# Patient Record
Sex: FEMALE | Race: WHITE | Employment: FULL TIME | URBAN - METROPOLITAN AREA
[De-identification: names, ages, dates, MRNs, and addresses within clinical notes are randomized per-mention and may not be internally consistent; named-entity substitution may affect disease eponyms.]

---

## 2017-09-12 PROBLEM — M65.342 TRIGGER RING FINGER OF LEFT HAND: Status: ACTIVE | Noted: 2017-09-12

## 2017-10-10 ENCOUNTER — TELEPHONE (OUTPATIENT)
Dept: INTERNAL MEDICINE CLINIC | Facility: CLINIC | Age: 67
End: 2017-10-10

## 2017-10-10 DIAGNOSIS — Z13.220 SCREENING, LIPID: ICD-10-CM

## 2017-10-10 DIAGNOSIS — E03.9 HYPOTHYROIDISM, UNSPECIFIED TYPE: Primary | ICD-10-CM

## 2017-10-10 DIAGNOSIS — Z13.0 SCREENING FOR DEFICIENCY ANEMIA: ICD-10-CM

## 2017-10-10 DIAGNOSIS — Z13.1 SCREENING FOR DIABETES MELLITUS: ICD-10-CM

## 2017-10-16 ENCOUNTER — OFFICE VISIT (OUTPATIENT)
Dept: INTERNAL MEDICINE CLINIC | Facility: CLINIC | Age: 67
End: 2017-10-16

## 2017-10-16 ENCOUNTER — PATIENT MESSAGE (OUTPATIENT)
Dept: INTERNAL MEDICINE CLINIC | Facility: CLINIC | Age: 67
End: 2017-10-16

## 2017-10-16 VITALS
RESPIRATION RATE: 16 BRPM | HEART RATE: 68 BPM | TEMPERATURE: 98 F | SYSTOLIC BLOOD PRESSURE: 112 MMHG | HEIGHT: 61.5 IN | BODY MASS INDEX: 24.23 KG/M2 | DIASTOLIC BLOOD PRESSURE: 64 MMHG | WEIGHT: 130 LBS

## 2017-10-16 DIAGNOSIS — Z13.1 SCREENING FOR DIABETES MELLITUS: ICD-10-CM

## 2017-10-16 DIAGNOSIS — Z13.220 SCREENING, LIPID: ICD-10-CM

## 2017-10-16 DIAGNOSIS — E03.9 HYPOTHYROIDISM, UNSPECIFIED TYPE: ICD-10-CM

## 2017-10-16 DIAGNOSIS — Z00.00 ENCOUNTER FOR ANNUAL HEALTH EXAMINATION: Primary | ICD-10-CM

## 2017-10-16 PROBLEM — M65.342 TRIGGER RING FINGER OF LEFT HAND: Status: RESOLVED | Noted: 2017-09-12 | Resolved: 2017-10-16

## 2017-10-16 PROCEDURE — G0402 INITIAL PREVENTIVE EXAM: HCPCS | Performed by: FAMILY MEDICINE

## 2017-10-16 PROCEDURE — G0008 ADMIN INFLUENZA VIRUS VAC: HCPCS | Performed by: FAMILY MEDICINE

## 2017-10-16 PROCEDURE — 90653 IIV ADJUVANT VACCINE IM: CPT | Performed by: FAMILY MEDICINE

## 2017-10-16 NOTE — PROGRESS NOTES
HPI:   Verna Sousa is a 74year old female who presents for a Medicare Initial Preventative Physical Exam (Welcome to Medicare- < 12 months on Medicare)      Here for annual check-up. Patient has no complaints.  Sees Endocrinology for hypothyroid by Does not apply route. aspirin 81 MG Oral Tab Take 81 mg by mouth daily. Glucosamine-Chondroit-Vit C-Mn (GLUCOSAMINE 1500 COMPLEX OR) Take  by mouth. Cholecalciferol (VITAMIN D-3 OR) Take  by mouth. Ascorbic Acid (VITAMIN C OR) Take  by mouth. (Left, 1/2004); other surgical history (Bilateral, 3/1999); foot/toes surgery proc unlisted (Left, 4/2012); electrocardiogram, complete (6/5/2013); other surgical history (4/13/2011, 6/21/2006); upper gi endoscopy,exam (6/12/2006); dental surgery procedure 112/64 (BP Location: Right arm, Patient Position: Sitting, Cuff Size: adult)   Pulse 68   Temp 98.4 °F (36.9 °C) (Oral)   Resp 16   Ht 61.5\"   Wt 130 lb   BMI 24.17 kg/m²  Estimated body mass index is 24.17 kg/m² as calculated from the following:    Marissa 03/17/2015, 09/15/2015, 09/12/2017   • IPV 09/26/2012   • Influenza 10/24/2011   • Influenza Vaccine, No Preserv, 3YR + 10/05/2015, 10/07/2016   • Pneumococcal (Prevnar 13) 10/05/2015   • Pneumovax 23 10/07/2016   • TDAP 12/21/2012   • Tb Intradermal Test Exercise;Stretching    How would you describe your daily physical activity?: Moderate    How would you describe your current health state?: Good    How do you maintain positive mental well-being?: Social Interaction;Games; Visiting Friends; Visiting Family and document. Then, refresh your progress note to see your input here.   Cognitive Assessment     What day of the week is this?: Correct    What month is it?: Correct    What year is it?: Correct    Recall \"Ball\": Correct    Recall \"Flag\": Correct Annually to 76, then as discussed Keke Kirkpatrick due on 04/29/1992 Update Health Maintenance if applicable     Immunizations (Update Immunization Activity if applicable)     Influenza  Covered Annually 10/7/2016 Please get every year    Pneumococcal 13 (Pr data found. Creat/alb ratio  Annually      LDL  Annually LDL-CHOLESTEROL (mg/dL (calc))   Date Value   09/30/2015 128     LDL Cholesterol (mg/dL)   Date Value   10/05/2016 110    No flowsheet data found.      Dilated Eye exam  Annually No flowsheet data

## 2017-10-16 NOTE — PATIENT INSTRUCTIONS
Roscoe Gonsalez's SCREENING SCHEDULE   Tests on this list are recommended by your physician but may not be covered, or covered at this frequency, by your insurer. Please check with your insurance carrier before scheduling to verify coverage.    PREVEN of the following criteria:   • Men who are 73-68 years old and have smoked more than 100 cigarettes in their lifetime   • Anyone with a family history    Colorectal Cancer Screening  Covered up to Age 76     Colonoscopy Screen   Covered every 10 years- mor performed in visit on 10/07/16  -FLU VAC NO PRSV 4 LEANNA 3 YRS+   Orders placed or performed in visit on 10/05/15  -FLU VAC NO PRSV 4 LEANNA 3 YRS+    Please get every year    Pneumococcal 13 (Prevnar)  Covered Once after 65   Orders placed or performed in visi [facility] regarding Advance Directives.

## 2017-10-17 NOTE — TELEPHONE ENCOUNTER
From: Denver Springer  To: Russell Andino DO  Sent: 10/16/2017 4:29 PM CDT  Subject: Other    Dr. Yael Dominguez,    These are the vaccinations that I have received over the last 5 years:  9/25/12 Hepatitis A 1 ml.  9/25/12 Typhoid Vi . 5 ml  9/26/12 IPV .

## 2017-11-15 ENCOUNTER — PATIENT MESSAGE (OUTPATIENT)
Dept: INTERNAL MEDICINE CLINIC | Facility: CLINIC | Age: 67
End: 2017-11-15

## 2018-01-03 ENCOUNTER — HOSPITAL (OUTPATIENT)
Dept: OTHER | Age: 68
End: 2018-01-03
Attending: OBSTETRICS & GYNECOLOGY

## 2018-01-11 NOTE — TELEPHONE ENCOUNTER
1/11/2018  SUBJECTIVE:  11 month old male with the following problems listed below presents with  hospital follow up for prolonged seizure that lasted longer than 40 minutes and was associated with respiratory depression and inability to maintain airway. Associated with fever. Viral panel showed coronavirus.  He was intubated overnight. Extubated without problem following morning. Diet was advanced without incident. Yesterday morning a pancakes for breakfast, only formula for lunch. Upon returning home he vomited. He then fell asleep and did not eat supper. He woke once in the night with a wet diaper and had a bottle of formula. He slept well through the night. This morning he had a diarrheal stool and wet diaper upon waking. Drank formula without further emesis, but not interested in solids. Otherwise behavior seems back to baseline.  They were discharged with instructions on controlling fever with Tylenol. A prescription of Tylenol was given to provide at . They have Diastat to use if a seizure lasts longer than 5 minutes and instructed to call 911  Both parents are very afraid because of what happened.    Current Outpatient Prescriptions   Medication Sig   • acetaminophen (TYLENOL) 160 MG/5ML solution Take 15 mg/kg by mouth every 4 hours as needed for Fever.   • DIAZepam (DIASTAT ACUDIAL) 10 MG rectal gel      No current facility-administered medications for this visit.        Patient Active Problem List    Diagnosis Date Noted   • BMI (body mass index), pediatric, 5% to less than 85% for age 2017     Priority: Medium   • Febrile seizure with status epilepticus (CMS/[facility]) 01/08/2018     Priority: Low     1/8/18 40 minutes. Intubated  1/18: Neuro: Marisela Schwarz: in hosp. Head CT neg. LTM EEG neg.          ALLERGIES: no known allergies.    Social History   Substance Use Topics   • Smoking status: Passive Smoke Exposure - Never Smoker   • Smokeless tobacco: Never Used      Comment: mom smokes in  Sanya messaged patient regarding last mammogram report. To call us to let us know where she had it completed so I can obtain the report. house   • Alcohol use No     Review of patient's social economics indicates:  No social economics status on file      ROS:  No additional fever since discharge home    OBJECTIVE: Pulse 156, temperature 97.6 °F (36.4 °C), temperature source Temporal Artery, resp. rate 24, weight 10.5 kg.  General: no apparent distress  Ears nose normal  Mouth mucous membranes are moist. No oral lesions  Neck-no cervical adenopathy  Lungs-clear to auscultation with quiet unlabored respirations  Heart-regular rhythm. No murmur  Abd: distended . Tympanic. Soft. Nontender. Normoactive bowel sounds  Extremities: Capillary refill time 2 seconds  Neuro: Moves all 4 extremities equally. Has equal strength in both upper extremities. Cranial nerves are grossly intact with good eye contact and facial muscle movements. Stands only with support    ASSESSMENT:     1. Febrile seizure with status epilepticus (CMS/HCC)    Supportive care discussed.  management discussed. Recommend if he has any seizure that they contact one of the parents. The parent would then determine whether to call 911 or use Diastat or just observe.    They have follow-up with neurology. Strongly recommend an MRI be performed due to the abnormal nature of the seizure. Discussion about antiepileptics should ensue at that visit due to level of parent concern    25 minutes was spent in direct face to face contact for discussion and exam of the above issues.

## 2018-04-06 ENCOUNTER — TELEPHONE (OUTPATIENT)
Dept: INTERNAL MEDICINE CLINIC | Facility: CLINIC | Age: 68
End: 2018-04-06

## 2018-04-06 NOTE — TELEPHONE ENCOUNTER
Future Appointments  Date Time Provider Rakel Grace   4/11/2018 2:45 PM [facility] N[facility]SE [facility] IM   4/12/2018 7:30 AM Shaka Hinds MD [facility]      Please put orders in for shingrix inj-pt has appt 4/11

## 2018-04-06 NOTE — TELEPHONE ENCOUNTER
Pt. Scheduled for [employer] on 4/11/18. Order pended for your approval if ok. Please advise.

## 2018-08-15 ENCOUNTER — TELEPHONE (OUTPATIENT)
Dept: INTERNAL MEDICINE CLINIC | Facility: CLINIC | Age: 68
End: 2018-08-15

## 2018-09-14 ENCOUNTER — TELEPHONE (OUTPATIENT)
Dept: INTERNAL MEDICINE CLINIC | Facility: CLINIC | Age: 68
End: 2018-09-14

## 2018-09-14 DIAGNOSIS — Z00.00 ROUTINE GENERAL MEDICAL EXAMINATION AT A HEALTH CARE FACILITY: Primary | ICD-10-CM

## 2018-09-14 NOTE — TELEPHONE ENCOUNTER
Future Appointments   Date Time Provider Rakel Lesly   10/18/2018 10:00 AM Susana Bishop DO [facility] IM   4/17/2019  7:30 AM Kellie Rodriguez MD [facility]      [facility]ders to [facility]- Pt aware to fast-no call back required

## 2018-10-18 ENCOUNTER — APPOINTMENT (OUTPATIENT)
Dept: LAB | Age: 68
End: 2018-10-18
Attending: FAMILY MEDICINE
Payer: MEDICARE

## 2018-10-18 ENCOUNTER — OFFICE VISIT (OUTPATIENT)
Dept: INTERNAL MEDICINE CLINIC | Facility: CLINIC | Age: 68
End: 2018-10-18
Payer: MEDICARE

## 2018-10-18 VITALS
DIASTOLIC BLOOD PRESSURE: 72 MMHG | HEART RATE: 70 BPM | BODY MASS INDEX: 24.04 KG/M2 | SYSTOLIC BLOOD PRESSURE: 112 MMHG | RESPIRATION RATE: 16 BRPM | TEMPERATURE: 98 F | HEIGHT: 61.5 IN | WEIGHT: 129 LBS

## 2018-10-18 DIAGNOSIS — M85.80 OSTEOPENIA, UNSPECIFIED LOCATION: ICD-10-CM

## 2018-10-18 DIAGNOSIS — E03.9 HYPOTHYROIDISM, UNSPECIFIED TYPE: ICD-10-CM

## 2018-10-18 DIAGNOSIS — J44.9 CHRONIC OBSTRUCTIVE PULMONARY DISEASE, UNSPECIFIED COPD TYPE (HCC): ICD-10-CM

## 2018-10-18 DIAGNOSIS — Z11.59 NEED FOR HEPATITIS C SCREENING TEST: ICD-10-CM

## 2018-10-18 DIAGNOSIS — Z00.00 ENCOUNTER FOR ANNUAL HEALTH EXAMINATION: Primary | ICD-10-CM

## 2018-10-18 PROCEDURE — G0439 PPPS, SUBSEQ VISIT: HCPCS | Performed by: FAMILY MEDICINE

## 2018-10-18 PROCEDURE — 90653 IIV ADJUVANT VACCINE IM: CPT | Performed by: FAMILY MEDICINE

## 2018-10-18 PROCEDURE — G0008 ADMIN INFLUENZA VIRUS VAC: HCPCS | Performed by: FAMILY MEDICINE

## 2018-10-18 PROCEDURE — 86803 HEPATITIS C AB TEST: CPT

## 2018-10-18 RX ORDER — LEVOTHYROXINE SODIUM 0.12 MG/1
125 TABLET ORAL
COMMUNITY
Start: 2018-05-23 | End: 2018-10-23

## 2018-10-18 NOTE — PROGRESS NOTES
HPI:   Nesiha Larios is a 79year old female who presents for a Medicare Subsequent Annual Wellness visit (Pt already had Initial Annual Wellness). Here for annual check-up. Patient has no complaints.        Hypothyroidism, unspecified type- taki 1982        Years since quittin.8      Smokeless tobacco: Never Used         CAGE Alcohol screening   Flex Carranza was screened for Alcohol abuse and had a score of 0 so is at low risk.     Patient Care Team: Patient Care Team:  Matthew Chaney Acid (VITAMIN C OR) Take  by mouth.       MEDICAL INFORMATION:   She  has a past medical history of [id number], & 36278/SX GLOABL RLW/RT ARM/ EXP 05/28/2014 (1/7/2014), AC (acromioclavicular) arthritis (6/3/2013), Back problem, Bicipital tenosynovitis (6/3 endoscopy,exam (6/12/2006); dental surgery procedure; Breast biopsy; carpal tunnel release (Right, 2/27/14); shoulder arthroscopy (10/2013, 7/2/2013); correct bunion,othr methods; hand/finger surgery unlisted (Left, 2/26/18--Dr. Billie Odonnell); TRIGGER FINGER RELE Patient Position: Sitting, Cuff Size: adult)   Pulse 70   Temp 98.1 °F (36.7 °C) (Oral)   Resp 16   Ht 61.5\"   Wt 129 lb   BMI 23.98 kg/m²  Estimated body mass index is 23.98 kg/m² as calculated from the following:    Height as of this encounter: 61.5\". 09/28/2017   • IPV 09/26/2012   • Influenza 10/24/2011   • Pneumococcal (Prevnar 13) 10/05/2015   • Pneumovax 23 10/07/2016   • TDAP 12/21/2012   • Tb Intradermal Test 09/02/2008   • Typhoid,VI Polysacharide IM 09/25/2012, 08/28/2017   • Zoster Vaccine Ondina 10/10/2018 118 (H)        EKG - w/ Initial Preventative Physical Exam only, or if medically necessary Electrocardiogram date10/05/2016       Colorectal Cancer Screening      Colonoscopy Screen every 10 years Colonoscopy due on 11/17/2018 Update Health Ma history found This may be covered with your prescription benefits, but Medicare does not cover unless Medically needed    Zoster  Not covered by Medicare Part B No vaccine history found This may be covered with your pharmacy  prescription benefits      SPE

## 2018-10-18 NOTE — PATIENT INSTRUCTIONS
Roscoe Gonsalez's SCREENING SCHEDULE   Tests on this list are recommended by your physician but may not be covered, or covered at this frequency, by your insurer. Please check with your insurance carrier before scheduling to verify coverage.    PREVEN 10 years- more often if abnormal Colonoscopy due on 11/17/2018 Update Health Maintenance if applicable    Flex Sigmoidoscopy Screen  Covered every 5 years No results found for this or any previous visit. No flowsheet data found.      Fecal Occult Blood   Co 10/05/15   • PNEUMOCOCCAL VACC, 13 LEANNA IM    Please get once after your 65th birthday    Pneumococcal 23 (Pneumovax)  Covered Once after 65 Orders placed or performed in visit on 10/07/16   • PNEUMOCOCCAL IMM (PNEUMOVAX)    Please get once after your 65th carrier before scheduling to verify coverage.    PREVENTATIVE SERVICES  INDICATIONS AND SCHEDULE Internal Lab or Procedure External Lab or Procedure   Diabetes Screening      HbgA1C    At Least  Annually for Diabetics No results found for: A1C No flowsheet No flowsheet data found. Fecal Occult Blood   Covered Annually No results found for: FOB, OCCULTSTOOL No flowsheet data found.      Barium Enema-   uncomfortable but covered  Covered but uncomfortable   Glaucoma Screening      Ophthalmology Visit   [facility] IMM (PNEUMOVAX)    Please get once after your 65th birthday    Hepatitis B for Moderate/High Risk       No orders found for this or any previous visit.  Medium/high risk factors:   End-stage renal disease   Hemophiliacs who received Factor VIII or IX concen

## 2018-10-21 PROBLEM — M65.342 TRIGGER RING FINGER OF LEFT HAND: Status: RESOLVED | Noted: 2017-09-12 | Resolved: 2018-10-21

## 2018-12-07 LAB — COLONOSCOPY STUDY: NORMAL

## 2019-01-04 ENCOUNTER — HOSPITAL (OUTPATIENT)
Dept: OTHER | Age: 69
End: 2019-01-04
Attending: OBSTETRICS & GYNECOLOGY

## 2019-08-15 ENCOUNTER — TELEPHONE (OUTPATIENT)
Dept: INTERNAL MEDICINE CLINIC | Facility: CLINIC | Age: 69
End: 2019-08-15

## 2019-09-10 ENCOUNTER — TELEPHONE (OUTPATIENT)
Dept: INTERNAL MEDICINE CLINIC | Facility: CLINIC | Age: 69
End: 2019-09-10

## 2019-09-10 DIAGNOSIS — E78.5 HYPERLIPIDEMIA, UNSPECIFIED HYPERLIPIDEMIA TYPE: ICD-10-CM

## 2019-09-10 DIAGNOSIS — J44.9 CHRONIC OBSTRUCTIVE PULMONARY DISEASE, UNSPECIFIED COPD TYPE (HCC): ICD-10-CM

## 2019-09-10 DIAGNOSIS — E03.9 HYPOTHYROIDISM, UNSPECIFIED TYPE: Primary | ICD-10-CM

## 2019-09-10 NOTE — TELEPHONE ENCOUNTER
Patient is scheduled for AWV. Please place lab orders to [facility] Pelt make sure labs are ordered with DX codes and not screening. Patient had issues in the past as coded wrong.      Future Appointments   Date Time Provider Rakel Grace   10/22/2019  9:

## 2019-09-10 NOTE — TELEPHONE ENCOUNTER
Called pt to confirm as preferred lab is listed as [facility]. Pt stated to change to [facility] as preferred. Done. AWV orders pended for your approval.  Please advise, thanks.

## 2019-10-22 ENCOUNTER — OFFICE VISIT (OUTPATIENT)
Dept: INTERNAL MEDICINE CLINIC | Facility: CLINIC | Age: 69
End: 2019-10-22
Payer: MEDICARE

## 2019-10-22 VITALS
BODY MASS INDEX: 24.32 KG/M2 | HEART RATE: 68 BPM | HEIGHT: 61.5 IN | RESPIRATION RATE: 16 BRPM | WEIGHT: 130.5 LBS | DIASTOLIC BLOOD PRESSURE: 72 MMHG | SYSTOLIC BLOOD PRESSURE: 114 MMHG | TEMPERATURE: 98 F

## 2019-10-22 DIAGNOSIS — Z00.00 ENCOUNTER FOR ANNUAL HEALTH EXAMINATION: Primary | ICD-10-CM

## 2019-10-22 DIAGNOSIS — E03.9 HYPOTHYROIDISM, UNSPECIFIED TYPE: ICD-10-CM

## 2019-10-22 DIAGNOSIS — E78.5 HYPERLIPIDEMIA, UNSPECIFIED HYPERLIPIDEMIA TYPE: ICD-10-CM

## 2019-10-22 DIAGNOSIS — M85.80 OSTEOPENIA, UNSPECIFIED LOCATION: ICD-10-CM

## 2019-10-22 PROCEDURE — G0439 PPPS, SUBSEQ VISIT: HCPCS | Performed by: FAMILY MEDICINE

## 2019-10-22 PROCEDURE — 90662 IIV NO PRSV INCREASED AG IM: CPT | Performed by: FAMILY MEDICINE

## 2019-10-22 PROCEDURE — G0008 ADMIN INFLUENZA VIRUS VAC: HCPCS | Performed by: FAMILY MEDICINE

## 2019-10-22 RX ORDER — OMEPRAZOLE 20 MG/1
20 CAPSULE, DELAYED RELEASE ORAL
Qty: 90 CAPSULE | Refills: 3 | Status: SHIPPED | OUTPATIENT
Start: 2019-10-22 | End: 2020-10-05

## 2019-10-22 NOTE — PROGRESS NOTES
2019 January  MAMMO ADVOCATE PROCEDURE1/4/2019  [facility]  Result Impression   BENIGN    There is no mammographic evidence of malignancy. A 1 year screening mammogram is recommended.     MAMMOGRAPHY BI-RADS: Jg kwon/hossein There are scattered fibroglandular elements in both breasts. No significant masses, calcifications, or other findings are seen in either breast.  Current study was also evaluated with a Computer Aided Detection (CAD) system.  Digital Breast   [employer] PHQ-2 SCORE: 0     Advanced Directive:   She does have a Living Will but we do NOT have it on file in [facility] [address]    Advance care planning including the explanation and discussion of advance directives standard forms performed Face to Face with patient and Family ALLERGIES:   She is allergic to dust mites; geocillin [carbenicillin]; mold spores; and penicillins. CURRENT MEDICATIONS:   OIL OF OREGANO OR, Take by mouth.   omeprazole 20 MG Oral Capsule Delayed Release, Take 1 capsule (20 mg total) by mouth every mor She  has a past medical history of [id number],50138, & 38868/SX GLOABL RLW/RT ARM/ EXP 05/28/2014 (1/7/2014), AC (acromioclavicular) arthritis (6/3/2013), Back problem, Bicipital tenosynovitis (6/3/2013), Biliary dyskinesia (10/2006), Body mass index between 19 She  has a past surgical history that includes cholecystectomy (10/25/2006); cataract (Bilateral, 2010); hysterectomy (3/1992);  (1989); hand/finger surgery unlisted (2014, 2012, 2005); other surgical history; foot/toes surgery proc unli SKIN: denies any unusual skin lesions  EYES: denies blurred vision or double vision  HEENT: denies nasal congestion, sinus pain or ST  LUNGS: denies shortness of breath with exertion  CARDIOVASCULAR: denies chest pain on exertion  GI: denies abdominal pain Pelvic: Deferred   Extremities: Extremities normal, atraumatic, no cyanosis or edema   Pulses: 2+ and symmetric   Skin: Skin color, texture, turgor normal, no rashes or lesions   Lymph nodes: Cervical, supraclavicular, and axillary nodes normal   Neurologi In the past six months, have you lost more than 10 pounds without trying?: (P) 2 - No  Has your appetite been poor?: (P) No  How does the patient maintain a good energy level?: Appropriate Exercise;Daily Walks;Stretching  How would you describe your daily Screening Mammogram      Mammogram Annually to 78, then as discussed Mammogram due on 01/04/2020 Update Health Maintenance if applicable     Immunizations (Update Immunization Activity if applicable)     Influenza  Covered Annually 10/18/2018 Please get ev

## 2019-10-22 NOTE — PATIENT INSTRUCTIONS
Terrence Gonsalez's SCREENING SCHEDULE   Tests on this list are recommended by your physician but may not be covered, or covered at this frequency, by your insurer. Please check with your insurance carrier before scheduling to verify coverage.    PREVEN more often if abnormal Colonoscopy due on 12/07/2023 Update Health Maintenance if applicable    Flex Sigmoidoscopy Screen  Covered every 5 years No results found for this or any previous visit. No flowsheet data found.      Fecal Occult Blood   Covered Maryann (Prevnar)  Covered Once after 65 Orders placed or performed in visit on 10/05/15   • PNEUMOCOCCAL VACC, 13 LEANNA IM    Please get once after your 65th birthday    Pneumococcal 23 (Pneumovax)  Covered Once after 65 Orders placed or performed in visit on 10/07

## 2019-10-29 ENCOUNTER — OFFICE VISIT (OUTPATIENT)
Dept: INTERNAL MEDICINE CLINIC | Facility: CLINIC | Age: 69
End: 2019-10-29
Payer: MEDICARE

## 2019-10-29 VITALS
TEMPERATURE: 98 F | HEIGHT: 61.5 IN | WEIGHT: 129.25 LBS | OXYGEN SATURATION: 98 % | DIASTOLIC BLOOD PRESSURE: 66 MMHG | BODY MASS INDEX: 24.09 KG/M2 | HEART RATE: 70 BPM | SYSTOLIC BLOOD PRESSURE: 112 MMHG | RESPIRATION RATE: 16 BRPM

## 2019-10-29 DIAGNOSIS — J06.9 UPPER RESPIRATORY TRACT INFECTION, UNSPECIFIED TYPE: Primary | ICD-10-CM

## 2019-10-29 PROCEDURE — 99214 OFFICE O/P EST MOD 30 MIN: CPT | Performed by: FAMILY MEDICINE

## 2019-10-29 RX ORDER — DOXYCYCLINE HYCLATE 100 MG
100 TABLET ORAL 2 TIMES DAILY
Qty: 14 TABLET | Refills: 0 | OUTPATIENT
Start: 2019-10-29 | End: 2019-10-29

## 2019-10-29 RX ORDER — BENZONATATE 100 MG/1
CAPSULE ORAL
Qty: 30 CAPSULE | Refills: 0 | Status: SHIPPED | OUTPATIENT
Start: 2019-10-29 | End: 2020-02-12 | Stop reason: ALTCHOICE

## 2019-10-29 RX ORDER — DOXYCYCLINE HYCLATE 100 MG
100 TABLET ORAL 2 TIMES DAILY
Qty: 14 TABLET | Refills: 0 | Status: SHIPPED | OUTPATIENT
Start: 2019-10-29 | End: 2019-11-04

## 2019-10-30 NOTE — PROGRESS NOTES
HPI:    Patient ID: Sandeep Sequeira is a 81year old female. HPI Here with one week of sore throat, cough, runny nose. No fever. No shortness of breath. Not taking anything for this.      Past Medical History:   Diagnosis Date   • [id number],04219, & 6009 fever.   HENT: Negative for congestion and ear pain. Respiratory: Positive for cough. Negative for shortness of breath.            benzonatate 100 MG Oral Cap, 1-2 caps PO TID PRN, Disp: 30 capsule, Rfl: 0  Doxycycline Hyclate 100 MG Oral Tab, Take 1 tab Eyes: Conjunctivae are normal.   Neck: Normal range of motion. Neck supple. Cardiovascular: Normal rate and regular rhythm. Pulmonary/Chest: Effort normal and breath sounds normal.   Skin: Skin is warm and dry. Vitals reviewed.              ASSESSME

## 2019-12-17 ENCOUNTER — HOSPITAL (OUTPATIENT)
Dept: OTHER | Age: 69
End: 2019-12-17
Attending: OBSTETRICS & GYNECOLOGY

## 2019-12-27 ENCOUNTER — EKG ENCOUNTER (OUTPATIENT)
Dept: LAB | Facility: HOSPITAL | Age: 69
End: 2019-12-27
Attending: ORTHOPAEDIC SURGERY
Payer: MEDICARE

## 2019-12-27 DIAGNOSIS — M77.01 MEDIAL EPICONDYLITIS OF RIGHT ELBOW: Primary | ICD-10-CM

## 2019-12-27 PROCEDURE — 93005 ELECTROCARDIOGRAM TRACING: CPT

## 2019-12-27 PROCEDURE — 93010 ELECTROCARDIOGRAM REPORT: CPT | Performed by: INTERNAL MEDICINE

## 2020-01-07 ENCOUNTER — TELEPHONE (OUTPATIENT)
Dept: INTERNAL MEDICINE CLINIC | Facility: CLINIC | Age: 70
End: 2020-01-07

## 2020-02-10 ENCOUNTER — HOSPITAL ENCOUNTER (OUTPATIENT)
Dept: CT IMAGING | Facility: HOSPITAL | Age: 70
Discharge: HOME | End: 2020-02-10
Attending: FAMILY MEDICINE

## 2020-02-10 DIAGNOSIS — R93.1 ELEVATED CORONARY ARTERY CALCIUM SCORE: ICD-10-CM

## 2020-02-10 DIAGNOSIS — E78.5 HYPERLIPIDEMIA, UNSPECIFIED HYPERLIPIDEMIA TYPE: ICD-10-CM

## 2020-02-12 ENCOUNTER — OFFICE VISIT (OUTPATIENT)
Dept: INTERNAL MEDICINE CLINIC | Facility: CLINIC | Age: 70
End: 2020-02-12
Payer: MEDICARE

## 2020-02-12 VITALS
RESPIRATION RATE: 16 BRPM | HEIGHT: 61.5 IN | HEART RATE: 75 BPM | TEMPERATURE: 98 F | SYSTOLIC BLOOD PRESSURE: 104 MMHG | WEIGHT: 130 LBS | DIASTOLIC BLOOD PRESSURE: 72 MMHG | BODY MASS INDEX: 24.23 KG/M2

## 2020-02-12 DIAGNOSIS — E78.5 HYPERLIPIDEMIA, UNSPECIFIED HYPERLIPIDEMIA TYPE: Primary | ICD-10-CM

## 2020-02-12 DIAGNOSIS — R93.1 ELEVATED CORONARY ARTERY CALCIUM SCORE: ICD-10-CM

## 2020-02-12 PROCEDURE — 99214 OFFICE O/P EST MOD 30 MIN: CPT | Performed by: FAMILY MEDICINE

## 2020-02-12 RX ORDER — ATORVASTATIN CALCIUM 20 MG/1
20 TABLET, FILM COATED ORAL NIGHTLY
Qty: 90 TABLET | Refills: 0 | Status: SHIPPED | OUTPATIENT
Start: 2020-02-12 | End: 2020-05-05

## 2020-02-14 PROBLEM — R93.1 ELEVATED CORONARY ARTERY CALCIUM SCORE: Status: ACTIVE | Noted: 2020-02-14

## 2020-02-14 PROBLEM — E78.5 HYPERLIPIDEMIA: Status: ACTIVE | Noted: 2020-02-14

## 2020-02-14 NOTE — PROGRESS NOTES
HPI:    Patient ID: Marquita Bowers is a 81year old female. HPI Here for f/u on CT Calcium score testing. Patient is aware of results. Is ok with starting cholesterol med.      Past Medical History:   Diagnosis Date   • 82242,06427, & 98655/SX GLOAB Heart Disorder Father    • Hypertension Father    • Lipids Father         hyperlipidemia   • Depression Mother    • Lung Disorder Mother         emphysema   • Thyroid Disorder Mother         hyperthyroidism   • Musculo-skelatal Disorder Mother         oste Comment:Sensitivity; Stomach Upset   PHYSICAL EXAM:   Physical Exam   Constitutional: She appears well-developed and well-nourished. HENT:   Head: Normocephalic and atraumatic. Pulmonary/Chest: Effort normal.   Neurological: She is alert.    Psychia

## 2020-03-31 ENCOUNTER — E-VISIT (OUTPATIENT)
Dept: FAMILY MEDICINE CLINIC | Facility: CLINIC | Age: 70
End: 2020-03-31

## 2020-03-31 DIAGNOSIS — R05.9 COUGH: Primary | ICD-10-CM

## 2020-05-05 RX ORDER — ATORVASTATIN CALCIUM 20 MG/1
20 TABLET, FILM COATED ORAL NIGHTLY
Qty: 90 TABLET | Refills: 3 | Status: SHIPPED | OUTPATIENT
Start: 2020-05-05 | End: 2021-04-19

## 2020-05-05 NOTE — TELEPHONE ENCOUNTER
Last Ov:2/12/20  Upcoming appt:none  Last labs:5/4/20 lipid, cmp  Last Rx:2/12/20 atorvastatin 20mg    Pass protocol.  Per AMS Protocol sent for review

## 2020-09-12 ENCOUNTER — TELEPHONE (OUTPATIENT)
Dept: INTERNAL MEDICINE CLINIC | Facility: CLINIC | Age: 70
End: 2020-09-12

## 2020-09-12 DIAGNOSIS — E78.5 HYPERLIPIDEMIA, UNSPECIFIED HYPERLIPIDEMIA TYPE: ICD-10-CM

## 2020-09-12 DIAGNOSIS — Z00.00 ROUTINE GENERAL MEDICAL EXAMINATION AT A HEALTH CARE FACILITY: Primary | ICD-10-CM

## 2020-09-12 DIAGNOSIS — Z13.0 SCREENING FOR ENDOCRINE, METABOLIC AND IMMUNITY DISORDER: ICD-10-CM

## 2020-09-12 DIAGNOSIS — Z13.29 SCREENING FOR THYROID DISORDER: ICD-10-CM

## 2020-09-12 DIAGNOSIS — Z13.228 SCREENING FOR ENDOCRINE, METABOLIC AND IMMUNITY DISORDER: ICD-10-CM

## 2020-09-12 DIAGNOSIS — Z13.0 SCREENING FOR BLOOD DISEASE: ICD-10-CM

## 2020-09-12 DIAGNOSIS — Z13.29 SCREENING FOR ENDOCRINE, METABOLIC AND IMMUNITY DISORDER: ICD-10-CM

## 2020-09-12 NOTE — TELEPHONE ENCOUNTER
Future Appointments   Date Time Provider Rakel Grace   10/23/2020  9:00 AM Mercy Baer,  [facility]     Annual Physical   Lab is [facility]  Pt aware to fast and to complete labs no sooner than 2 weeks prior to physical   No call back requ

## 2020-10-05 RX ORDER — OMEPRAZOLE 20 MG/1
20 CAPSULE, DELAYED RELEASE ORAL
Qty: 90 CAPSULE | Refills: 1 | Status: SHIPPED | OUTPATIENT
Start: 2020-10-05 | End: 2021-03-30

## 2020-10-05 NOTE — TELEPHONE ENCOUNTER
Last visit- 02/12/2020 hyperlipidemia follow up     Last refill- 10/22/2019 omeprazole 20mg QTY 90 3R    Last labs- 05/04/2020 cmp, lipid  Future Appointments   Date Time Provider Rakel Grace   10/5/2020  1:30 PM Stan Enciso MD [facility]

## 2020-10-26 PROBLEM — M67.442 DIGITAL MUCINOUS CYST OF FINGER OF LEFT HAND: Status: ACTIVE | Noted: 2020-10-26

## 2020-10-27 ENCOUNTER — OFFICE VISIT (OUTPATIENT)
Dept: INTERNAL MEDICINE CLINIC | Facility: CLINIC | Age: 70
End: 2020-10-27
Payer: MEDICARE

## 2020-10-27 VITALS
HEART RATE: 74 BPM | BODY MASS INDEX: 24.19 KG/M2 | HEIGHT: 61.5 IN | SYSTOLIC BLOOD PRESSURE: 122 MMHG | DIASTOLIC BLOOD PRESSURE: 76 MMHG | WEIGHT: 129.81 LBS

## 2020-10-27 DIAGNOSIS — M85.80 OSTEOPENIA, UNSPECIFIED LOCATION: ICD-10-CM

## 2020-10-27 DIAGNOSIS — M67.442 DIGITAL MUCINOUS CYST OF FINGER OF LEFT HAND: ICD-10-CM

## 2020-10-27 DIAGNOSIS — Z00.00 ENCOUNTER FOR ANNUAL HEALTH EXAMINATION: Primary | ICD-10-CM

## 2020-10-27 DIAGNOSIS — R93.1 ELEVATED CORONARY ARTERY CALCIUM SCORE: ICD-10-CM

## 2020-10-27 DIAGNOSIS — E03.9 HYPOTHYROIDISM, UNSPECIFIED TYPE: ICD-10-CM

## 2020-10-27 DIAGNOSIS — E78.5 HYPERLIPIDEMIA, UNSPECIFIED HYPERLIPIDEMIA TYPE: ICD-10-CM

## 2020-10-27 DIAGNOSIS — J44.9 CHRONIC OBSTRUCTIVE PULMONARY DISEASE, UNSPECIFIED COPD TYPE (HCC): ICD-10-CM

## 2020-10-27 PROCEDURE — 90670 PCV13 VACCINE IM: CPT | Performed by: FAMILY MEDICINE

## 2020-10-27 PROCEDURE — G0009 ADMIN PNEUMOCOCCAL VACCINE: HCPCS | Performed by: FAMILY MEDICINE

## 2020-10-27 PROCEDURE — G0439 PPPS, SUBSEQ VISIT: HCPCS | Performed by: FAMILY MEDICINE

## 2020-10-27 NOTE — PROGRESS NOTES
HPI:   Leo Elliott is a 78year old female who presents for a Medicare Subsequent Annual Wellness visit (Pt already had Initial Annual Wellness).       Chronic obstructive pulmonary disease, unspecified COPD type ([facility]- has not needed medications Years since quittin.8      Smokeless tobacco: Never Used       Ms. Gonsalez already takes aspirin and has it on her medication list.   CAGE Alcohol screening   Helen Sullivan was screened for Alcohol abuse and had a score of 0 so is at low risk. OR), Take  by mouth. •  Omega-3 Fatty Acids (FISH OIL) 1000 MG Oral Capsule Delayed Release, Take  by mouth. •  Calcium Carbonate-Vitamin D (CALCIUM + D OR), Take  by mouth. •  GINKGO BILOBA, by Does not apply route.     •  aspirin 81 MG Oral Tab, unlisted (2/27/14); other (2/27/14); tubal ligation (3/1991); foot/toes surgery proc unlisted (Left, 2/2013); colonoscopy (2011, 7/15/2002, 9/2007); other surgical history (Left, 1/2004); other surgical history (Bilateral, 3/1999); foot/toes surgery proc u abdominal pain, denies heartburn  : denies dysuria, vaginal discharge or itching, no complaint of urinary incontinence   MUSCULOSKELETAL: denies back pain  NEURO: denies headaches  PSYCHE: denies depression or anxiety  HEMATOLOGIC: denies hx of anemia  E 10/22/2019   • FLUAD High Dose 65 yr and older (39240) 10/16/2017, 10/18/2018   • FLUZONE 6 months and older PFS 0.5 ml (96779) 10/05/2015, 10/07/2016   • HEP A 09/25/2012, 08/28/2017   • HEP B 08/28/2017, 09/28/2017, 03/01/2018   • IPV 09/26/2012   • Infl calcium and vitamin D. F/u Gyne. Digital mucinous cyst of finger of left hand- f/u Hand specialist.     Other orders  -     PNEUMOCOCCAL VACC, 13 LEANNA IM    Return in 1 year (on 10/27/2021).      Missy Bhakta DO, 10/27/2020     General Health     In t There are no preventive care reminders to display for this patient. Update Health Maintenance if applicable    Chlamydia  Annually if high risk No results found for: CHLAMYDIA No flowsheet data found.     Screening Mammogram      Mammogram Annually to 78, t

## 2020-10-27 NOTE — PATIENT INSTRUCTIONS
Kathrin Gonsalez's SCREENING SCHEDULE   Tests on this list are recommended by your physician but may not be covered, or covered at this frequency, by your insurer. Please check with your insurance carrier before scheduling to verify coverage.    PREVEN more often if abnormal Colonoscopy due on 12/07/2023 Update Health Maintenance if applicable    Flex Sigmoidoscopy Screen  Covered every 5 years No results found for this or any previous visit. No flowsheet data found.      Fecal Occult Blood   Covered Maryann (Prevnar)  Covered Once after 65 Orders placed or performed in visit on 10/05/15   • PNEUMOCOCCAL VACC, 13 LEANNA IM    Please get once after your 65th birthday    Pneumococcal 23 (Pneumovax)  Covered Once after 65 Orders placed or performed in visit on 10/07

## 2020-12-17 ENCOUNTER — LAB REQUISITION (OUTPATIENT)
Dept: LAB | Facility: HOSPITAL | Age: 70
End: 2020-12-17
Payer: MEDICARE

## 2020-12-17 DIAGNOSIS — M67.442 GANGLION, LEFT HAND: ICD-10-CM

## 2020-12-17 DIAGNOSIS — M65.352 TRIGGER FINGER, LEFT LITTLE FINGER: ICD-10-CM

## 2020-12-17 PROCEDURE — 88304 TISSUE EXAM BY PATHOLOGIST: CPT | Performed by: ORTHOPAEDIC SURGERY

## 2020-12-21 DIAGNOSIS — Z12.31 ENCOUNTER FOR SCREENING MAMMOGRAM FOR MALIGNANT NEOPLASM OF BREAST: Primary | ICD-10-CM

## 2020-12-29 ENCOUNTER — TELEPHONE (OUTPATIENT)
Dept: INTERNAL MEDICINE CLINIC | Facility: CLINIC | Age: 70
End: 2020-12-29

## 2020-12-29 NOTE — TELEPHONE ENCOUNTER
Received audiometric results from [facility] for visit on 12/18/2020. Placed in AMS bin to review.

## 2021-01-07 ENCOUNTER — HOSPITAL ENCOUNTER (OUTPATIENT)
Dept: MAMMOGRAPHY | Age: 71
Discharge: HOME | End: 2021-01-07
Attending: OBSTETRICS & GYNECOLOGY

## 2021-01-07 DIAGNOSIS — Z12.31 ENCOUNTER FOR SCREENING MAMMOGRAM FOR MALIGNANT NEOPLASM OF BREAST: ICD-10-CM

## 2021-01-07 PROCEDURE — 77063 BREAST TOMOSYNTHESIS BI: CPT

## 2021-02-01 ENCOUNTER — HOSPITAL ENCOUNTER (OUTPATIENT)
Dept: CARDIOLOGY CLINIC | Facility: HOSPITAL | Age: 71
Discharge: HOME | End: 2021-02-01
Attending: FAMILY MEDICINE

## 2021-02-01 DIAGNOSIS — Z13.9 ENCOUNTER FOR SCREENING: ICD-10-CM

## 2021-02-05 ENCOUNTER — TELEPHONE (OUTPATIENT)
Dept: INTERNAL MEDICINE CLINIC | Facility: CLINIC | Age: 71
End: 2021-02-05

## 2021-02-05 DIAGNOSIS — Z23 NEED FOR VACCINATION: ICD-10-CM

## 2021-02-05 DIAGNOSIS — I65.23 BILATERAL CAROTID ARTERY STENOSIS: Primary | ICD-10-CM

## 2021-02-05 NOTE — TELEPHONE ENCOUNTER
MAMMO SCREENING BILATERAL W TOMO1/7/2021  [facility]  Result Impression   MAMMOGRAPHY  BENIGN    There is no mammographic evidence of malignancy.  A 1 year screening mammogram is recommended.            MAMMOGRAPHY BI-RADS: 2 BENIGN    Electroni No significant masses, calcifications, or other findings are seen in either breast.    Current study was also evaluated with a Computer Aided Detection (CAD) system.  Digital Breast Tomosynthesis (DBT) images were obtained and used to assist in the interpre

## 2021-02-22 ENCOUNTER — HOSPITAL ENCOUNTER (OUTPATIENT)
Dept: ULTRASOUND IMAGING | Facility: HOSPITAL | Age: 71
Discharge: HOME | End: 2021-02-22
Attending: FAMILY MEDICINE
Payer: MEDICARE

## 2021-02-22 DIAGNOSIS — I65.23 BILATERAL CAROTID ARTERY STENOSIS: ICD-10-CM

## 2021-02-22 PROCEDURE — 93880 EXTRACRANIAL BILAT STUDY: CPT | Performed by: FAMILY MEDICINE

## 2021-03-19 ENCOUNTER — PATIENT MESSAGE (OUTPATIENT)
Dept: INTERNAL MEDICINE CLINIC | Facility: CLINIC | Age: 71
End: 2021-03-19

## 2021-03-22 ENCOUNTER — MED REC SCAN ONLY (OUTPATIENT)
Dept: INTERNAL MEDICINE CLINIC | Facility: CLINIC | Age: 71
End: 2021-03-22

## 2021-03-22 NOTE — TELEPHONE ENCOUNTER
From: Nick Moon  To: Roberto Osorio DO  Sent: 3/19/2021 4:50 PM CDT  Subject: Non-Urgent Medical Question    Dr. Margarita Ferguson,  I just want to let you know that I have received both doses of the Covid-19 vaccine.  My second dose was on February 24,

## 2021-03-22 NOTE — TELEPHONE ENCOUNTER
First COVID19 vaccine abstracted from immunization reconciliation   Message sent to pt requesting image of vaccination card for record purposes.

## 2021-03-30 RX ORDER — OMEPRAZOLE 20 MG/1
20 CAPSULE, DELAYED RELEASE ORAL
Qty: 90 CAPSULE | Refills: 2 | Status: SHIPPED | OUTPATIENT
Start: 2021-03-30 | End: 2021-12-22

## 2021-03-30 NOTE — TELEPHONE ENCOUNTER
Last visit- 10/27/2020 cpe    Last refill-  10/05/2020 omeprazole 20mg QTY90 1R    Last labs-  10/16/2020 tsh, lipid, cmp, cbc  Future Appointments   Date Time Provider Rakel Grace   6/2/2021  7:30 AM Tana Peck MD [facility]

## 2021-04-19 RX ORDER — ATORVASTATIN CALCIUM 20 MG/1
20 TABLET, FILM COATED ORAL NIGHTLY
Qty: 90 TABLET | Refills: 2 | Status: SHIPPED | OUTPATIENT
Start: 2021-04-19 | End: 2022-01-06

## 2021-04-19 NOTE — TELEPHONE ENCOUNTER
Last visit- 10/27/2020 cpe    Last refill-  05/05/2020 atorvastatin 20mg QTY90 3R    Last labs-  10/16/2020 tsh, lipid, cmp, cbc  Future Appointments   Date Time Provider Rakel Grace   6/2/2021  7:30 AM Erin Lim MD [facility]

## 2021-08-26 ENCOUNTER — TELEPHONE (OUTPATIENT)
Dept: INTERNAL MEDICINE CLINIC | Facility: CLINIC | Age: 71
End: 2021-08-26

## 2021-08-26 DIAGNOSIS — E78.5 HYPERLIPIDEMIA, UNSPECIFIED HYPERLIPIDEMIA TYPE: ICD-10-CM

## 2021-08-26 DIAGNOSIS — Z00.00 ROUTINE GENERAL MEDICAL EXAMINATION AT A HEALTH CARE FACILITY: Primary | ICD-10-CM

## 2021-08-26 DIAGNOSIS — E03.9 HYPOTHYROIDISM, UNSPECIFIED TYPE: ICD-10-CM

## 2021-08-26 NOTE — TELEPHONE ENCOUNTER
Future Appointments   Date Time Provider Rakel Grace   10/29/2021  8:15 AM Abhay Welch DO [facility]   6/8/2022  7:30 AM Emil Sullivan MD [facility]     [facility] to [facility]- Pt informed that labs need to be completed no sooner than

## 2021-10-29 ENCOUNTER — OFFICE VISIT (OUTPATIENT)
Dept: INTERNAL MEDICINE CLINIC | Facility: CLINIC | Age: 71
End: 2021-10-29
Payer: MEDICARE

## 2021-10-29 VITALS
HEIGHT: 61.5 IN | DIASTOLIC BLOOD PRESSURE: 60 MMHG | OXYGEN SATURATION: 98 % | BODY MASS INDEX: 24.35 KG/M2 | WEIGHT: 130.63 LBS | HEART RATE: 68 BPM | SYSTOLIC BLOOD PRESSURE: 118 MMHG

## 2021-10-29 DIAGNOSIS — R93.1 ELEVATED CORONARY ARTERY CALCIUM SCORE: ICD-10-CM

## 2021-10-29 DIAGNOSIS — E78.5 HYPERLIPIDEMIA, UNSPECIFIED HYPERLIPIDEMIA TYPE: ICD-10-CM

## 2021-10-29 DIAGNOSIS — J44.9 CHRONIC OBSTRUCTIVE PULMONARY DISEASE, UNSPECIFIED COPD TYPE (HCC): ICD-10-CM

## 2021-10-29 DIAGNOSIS — E03.9 HYPOTHYROIDISM, UNSPECIFIED TYPE: ICD-10-CM

## 2021-10-29 DIAGNOSIS — M85.80 OSTEOPENIA, UNSPECIFIED LOCATION: ICD-10-CM

## 2021-10-29 DIAGNOSIS — Z00.00 ENCOUNTER FOR ANNUAL HEALTH EXAMINATION: Primary | ICD-10-CM

## 2021-10-29 PROBLEM — M67.442 DIGITAL MUCINOUS CYST OF FINGER OF LEFT HAND: Status: RESOLVED | Noted: 2020-10-26 | Resolved: 2021-10-29

## 2021-10-29 PROCEDURE — G0439 PPPS, SUBSEQ VISIT: HCPCS | Performed by: FAMILY MEDICINE

## 2021-10-29 RX ORDER — ESTRADIOL 10 UG/1
10 INSERT VAGINAL
COMMUNITY

## 2021-10-29 NOTE — PATIENT INSTRUCTIONS
Charmaine Gonsalez's SCREENING SCHEDULE   Tests on this list are recommended by your physician but may not be covered, or covered at this frequency, by your insurer. Please check with your insurance carrier before scheduling to verify coverage.    PREV time   Pap and Pelvic    Pap   Covered every 2 years for women at normal risk;  Annually if at high risk -  No recommendations at this time    Chlamydia Annually if high risk -  No recommendations at this time   Screening Mammogram    Mammogram     Recommen information from the [address] [place] [address] regarding Advance Directives. all other ROS negative except as per HPI

## 2021-10-29 NOTE — PROGRESS NOTES
HPI:   Renetta Saunders is a 73year old female who presents for a Medicare Subsequent Annual Wellness visit (Pt already had Initial Annual Wellness). Encounter for annual health examination- doing well. Exercises regularly. Has Gyne and Endo. Years: 14.00        Pack years: 24        Types: Cigarettes        Quit date: 1982        Years since quittin.8      Smokeless tobacco: Never Used       Ms. Gonsalez already takes aspirin and has it on her medication list.   CAGE screening score mouth.  Omega-3 Fatty Acids (FISH OIL) 1000 MG Oral Capsule Delayed Release, Take  by mouth. Calcium Carbonate-Vitamin D (CALCIUM + D OR), Take  by mouth. GINKGO BILOBA, by Does not apply route. aspirin 81 MG Oral Tab, Take 81 mg by mouth daily.   Glucos ligation (3/1991); foot/toes surgery proc unlisted (Left, 2/2013); colonoscopy (2011, 7/15/2002, 9/2007); other surgical history (Left, 1/2004); other surgical history (Bilateral, 3/1999); foot/toes surgery proc unlisted (Left, 4/2012); electrocardiogram, dysuria, vaginal discharge or itching, no complaint of urinary incontinence   MUSCULOSKELETAL: denies back pain  NEURO: denies headaches  PSYCHE: denies depression or anxiety  HEMATOLOGIC: denies hx of anemia  ENDOCRINE: denies thyroid history  ALL/ASTHMA: Administered   • Covid-19 Vaccine Pfizer 30 mcg/0.3 ml 02/03/2021, 02/24/2021, 09/26/2021   • DTP 08/28/2017   • Depo-Medrol 40mg Inj 08/22/2014, 03/17/2015, 03/17/2015, 09/15/2015, 09/12/2017, 03/30/2018, 10/23/2018, 02/19/2019, 07/09/2019, 10/04/2019, 08 score- cholesterol controlled on statin. Continue. Hypothyroidism, unspecified type- TSH in range. Continue current med dose. Osteopenia, unspecified location- continue vitamin D and calcium and exercise. DEXA with Endo 2022.      Chronic obstructiv 12/27/2019      Ultrasound Screening for Abdominal Aortic Aneurysm (AAA) Covered once in a lifetime for one of the following risk factors   • Men who are 73-68 years old and have ever smoked   • Anyone with a family history -     Colorectal Cancer Screenin Zoster Not covered by Medicare Part B; may be covered with your pharmacy  prescription benefits 08/05/2012  No recommendations at this time         Chronic Obstructive Pulmonary Disease (COPD)    Spirometry Annually Spirometry date:

## 2021-12-22 RX ORDER — OMEPRAZOLE 20 MG/1
20 CAPSULE, DELAYED RELEASE ORAL
Qty: 90 CAPSULE | Refills: 0 | Status: SHIPPED | OUTPATIENT
Start: 2021-12-22

## 2021-12-22 NOTE — TELEPHONE ENCOUNTER
Last visit- 10/29/2021 cpe     Last refill- 03/30/2021 omeprazole 20mg QTY90 2R    Last labs- 10/22/2021 tsh, lipid, cmp, cbc  Future Appointments   Date Time Provider Rakel Grace   6/8/2022  7:30 AM Erin Lim MD [facility]

## 2022-01-06 RX ORDER — ATORVASTATIN CALCIUM 20 MG/1
20 TABLET, FILM COATED ORAL NIGHTLY
Qty: 90 TABLET | Refills: 2 | Status: SHIPPED | OUTPATIENT
Start: 2022-01-06

## 2022-01-06 NOTE — TELEPHONE ENCOUNTER
Last visit- 10/29/2021 cpe     Last refill- 04/19/2021 atorvastatin 20mg QTY90 2R    Last labs- 10/22/2021 tsh, lipid, cmp, cbc  Future Appointments   Date Time Provider Rakel Grace   6/8/2022  7:30 AM Brie Montes MD [facility]

## 2022-01-11 ENCOUNTER — HOSPITAL ENCOUNTER (OUTPATIENT)
Dept: GENERAL RADIOLOGY | Age: 72
Discharge: HOME | End: 2022-01-11
Attending: OBSTETRICS & GYNECOLOGY

## 2022-01-11 ENCOUNTER — HOSPITAL ENCOUNTER (OUTPATIENT)
Dept: MAMMOGRAPHY | Age: 72
Discharge: HOME | End: 2022-01-11
Attending: OBSTETRICS & GYNECOLOGY

## 2022-01-11 DIAGNOSIS — M85.89 OTHER SPECIFIED DISORDERS OF BONE DENSITY AND STRUCTURE, MULTIPLE SITES: ICD-10-CM

## 2022-01-11 DIAGNOSIS — Z12.31 ENCOUNTER FOR SCREENING MAMMOGRAM FOR MALIGNANT NEOPLASM OF BREAST: ICD-10-CM

## 2022-01-11 PROCEDURE — 77080 DXA BONE DENSITY AXIAL: CPT

## 2022-01-11 PROCEDURE — 77063 BREAST TOMOSYNTHESIS BI: CPT

## 2022-01-12 ENCOUNTER — TELEPHONE (OUTPATIENT)
Dept: INTERNAL MEDICINE CLINIC | Facility: CLINIC | Age: 72
End: 2022-01-12

## 2022-01-12 NOTE — TELEPHONE ENCOUNTER
Luciana Calzada MD - 01/11/2022    Formatting of this note might be different from the original.    #[id number] - MAMMO SCREENING BILATERAL W JORDYN    BILATERAL DIGITAL SCREENING MAMMOGRAM 3D/2D WITH CAD WITH MEDIOLATERAL OBLIQUE CRANIOCAUDAL: 1/11/2022

## 2022-05-05 RX ORDER — OMEPRAZOLE 20 MG/1
20 CAPSULE, DELAYED RELEASE ORAL
Qty: 90 CAPSULE | Refills: 0 | Status: SHIPPED | OUTPATIENT
Start: 2022-05-05

## 2022-05-05 NOTE — TELEPHONE ENCOUNTER
Last visit- 10/29/2021 cpe     Last refill- 12/22/2021 omeprazole 20mg QTY90 0R    Last labs- 10/22/2021 tsh, lipid, cmp, cbc    No future appointments.

## 2022-06-28 ENCOUNTER — OFFICE VISIT (OUTPATIENT)
Dept: INTERNAL MEDICINE CLINIC | Facility: CLINIC | Age: 72
End: 2022-06-28
Payer: MEDICARE

## 2022-06-28 VITALS
SYSTOLIC BLOOD PRESSURE: 130 MMHG | BODY MASS INDEX: 23.55 KG/M2 | OXYGEN SATURATION: 97 % | HEART RATE: 86 BPM | HEIGHT: 61.5 IN | DIASTOLIC BLOOD PRESSURE: 70 MMHG | WEIGHT: 126.38 LBS

## 2022-06-28 DIAGNOSIS — R63.4 WEIGHT LOSS: Primary | ICD-10-CM

## 2022-06-28 PROCEDURE — 3078F DIAST BP <80 MM HG: CPT | Performed by: FAMILY MEDICINE

## 2022-06-28 PROCEDURE — 3008F BODY MASS INDEX DOCD: CPT | Performed by: FAMILY MEDICINE

## 2022-06-28 PROCEDURE — 99213 OFFICE O/P EST LOW 20 MIN: CPT | Performed by: FAMILY MEDICINE

## 2022-06-28 PROCEDURE — 3075F SYST BP GE 130 - 139MM HG: CPT | Performed by: FAMILY MEDICINE

## 2022-07-04 ENCOUNTER — PATIENT MESSAGE (OUTPATIENT)
Dept: INTERNAL MEDICINE CLINIC | Facility: CLINIC | Age: 72
End: 2022-07-04

## 2022-07-05 NOTE — TELEPHONE ENCOUNTER
From: Tracy Carrillo  To: Nir Morales DO  Sent: 7/4/2022 6:49 AM CDT  Subject: Weight issue    Dr Wellington Ferro,  I weighed myself this morning and the scale said 124. when I saw you last week I was 126, but fully dressed. This morning I just had lightweight pajamas on.

## 2022-07-12 ENCOUNTER — TELEPHONE (OUTPATIENT)
Dept: INTERNAL MEDICINE CLINIC | Facility: CLINIC | Age: 72
End: 2022-07-12

## 2022-07-12 NOTE — TELEPHONE ENCOUNTER
Kidney function is normal. Taking with her to [facility] in case she tests positive. Was recently exposed to someone who went on to test positive the next day. Not an absolute contraindication with the specific statin she takes but ok to hold statin 12 hours prior to starting med and continue to hold during course of med, restart 5 days after completing course of med.

## 2022-07-12 NOTE — TELEPHONE ENCOUNTER
Tiffanie Martinez, pharmacist stated she needs to know how many days since the onset of sxs - needs to know before she dispenses.   Pharmacist also stated she needs to know if kidney function is normal.    nirmatrelvir & ritonavir 20 x 150 MG & 10 x 100MG Oral Tablet Therapy Pack 30 tablet

## 2022-07-12 NOTE — TELEPHONE ENCOUNTER
[place] calling again as there is now a medication interaction with statin pt is taking, please call back as they can't dispense without more information.

## 2022-07-13 NOTE — TELEPHONE ENCOUNTER
Pt calling back stating that pharmacy won't fill RX as pt doesn't currently have covid. She is asking if RX can be sent to different pharmacy?  She would like a call back on her cell phone at [phone]

## 2022-07-13 NOTE — TELEPHONE ENCOUNTER
Pt wanting RX to be called into [id number] in [place] letting them know it's ok to fill with the drug interaction that comes up.

## 2022-07-28 ENCOUNTER — TELEPHONE (OUTPATIENT)
Dept: INTERNAL MEDICINE CLINIC | Facility: CLINIC | Age: 72
End: 2022-07-28

## 2022-07-28 NOTE — TELEPHONE ENCOUNTER
Pt sxs started 7/26/22. Experiencing body aches, chills, sore throat, fatigue, head congestion, cough. AMS, do you think pt would benefit from paxlovid? COPD on problem list. One appt available tomorrow, but pt and  (see other TE) are +. Ok to do both during same VV? Or recommend UC today?

## 2022-07-28 NOTE — TELEPHONE ENCOUNTER
I had discussed Paxlovid with both patient and  and sent Rx with strict instructions to only take if positive COVID. There were some issues with pharmacy filling since they were not technically COVID positive at the time of the Rx, so maybe patient and  never received Rx? If they did, ok to start med. Patient will need to stop Atorvastatin during course of Paxlovid.

## 2022-07-28 NOTE — TELEPHONE ENCOUNTER
[place] on the last day. Pt wants to know next steps, no appts available. What is the patient's vaccine status? 2 vaccines, 2 boosters     Is the patient symptomatic? Yes has achiness, chills, sore throat, fatigue, stuffy head, coughing and runny nose. If so, what are the symptoms and when did symptoms start? Tuesday, 7/26    When was the exposure? 7/24    Were one or both people involved unmasked for more than 15 minutes when exposed? Pt was wearing a mask, people hacking were not. Have you had a covid test (home, pharmacy or within a healthcare system)?    If you have results we can view please bring those with you

## 2022-07-28 NOTE — TELEPHONE ENCOUNTER
Spoke to pt. Per pt, they were able to  Paxlovid, but asked if AMS would recommend MAB over paxlovid. Informed pt that there is no more MAB. We have run out. Pt said that they will proceed with Paxlovid. Advised to hold Atorvastatin while on Paxlovid. Pt said that she stopped taking this on Monday. Will remain off until finishing Paxlovid. Advised to call with any questions or concerns. Pt stated understanding and agreed to plan. FYI to AMS.

## 2022-08-03 ENCOUNTER — PATIENT MESSAGE (OUTPATIENT)
Dept: INTERNAL MEDICINE CLINIC | Facility: CLINIC | Age: 72
End: 2022-08-03

## 2022-08-03 NOTE — TELEPHONE ENCOUNTER
Routed to Dr. Marvin York for an update. Patient sent my chart message with update regarding testing positive on 7/27/22. UC/ER precautions were provided. Patient update 8/2/22   Reports:  \"Nagging cough\"  Patient stated, \" I am feeling 95%. \"    Continue to monitor symptoms and supportive therapy? Any other recommendations?

## 2022-08-08 RX ORDER — OMEPRAZOLE 20 MG/1
20 CAPSULE, DELAYED RELEASE ORAL
Qty: 90 CAPSULE | Refills: 3 | Status: SHIPPED | OUTPATIENT
Start: 2022-08-08

## 2022-08-12 NOTE — TELEPHONE ENCOUNTER
From: Michael Garcia  Sent: 8/11/2022 8:48 PM CDT  To: [facility] Clinical Staff  Subject: Weight issue    Dr Sophie Craig,  You should know that Edwige Franklin and I finished our [place] tested negative on 8/2, I tested negative on 8/4. But on Sunday, 8/7 I tested positive again and Edwige Franklin tested positive again on 8/8. Our symptoms are not as severe as the first round of Covid and we are both doing pretty well now. The only reason we tested last weekend was we were invited to a party where there would be a lot of elderly people and my congestion had gotten much worse than the day before. Neither one of us has had any trouble breathing or catching our breath.

## 2022-08-12 NOTE — TELEPHONE ENCOUNTER
See new message thread to reflect accurate date. 8/12/2022 as patient sent messages through older message thread.

## 2022-08-22 ENCOUNTER — TELEPHONE (OUTPATIENT)
Dept: INTERNAL MEDICINE CLINIC | Facility: CLINIC | Age: 72
End: 2022-08-22

## 2022-08-22 NOTE — TELEPHONE ENCOUNTER
JAYA    Spoke to patient. Reports has scoliosis and has had back discomfort and this epigastric \"awareness\" for years. States it's not pain, she kind of just feels something there that she can't describe. States there is a sensitive spot to her right \"breast bone\" when putting pressure on it. Denies cp, sob, n/v/d, urinary sx, or BM issues. No fever. Advised okay to cont with appt. Pt states just feels it's time to get checked out.

## 2022-08-22 NOTE — TELEPHONE ENCOUNTER
Patient scheduled on my-chart    mild upper abdominal discomfort  This is one of our triage questions

## 2022-08-23 ENCOUNTER — OFFICE VISIT (OUTPATIENT)
Dept: INTERNAL MEDICINE CLINIC | Facility: CLINIC | Age: 72
End: 2022-08-23
Payer: MEDICARE

## 2022-08-23 ENCOUNTER — HOSPITAL ENCOUNTER (OUTPATIENT)
Dept: GENERAL RADIOLOGY | Age: 72
Discharge: HOME | End: 2022-08-23
Attending: FAMILY MEDICINE
Payer: MEDICARE

## 2022-08-23 VITALS
OXYGEN SATURATION: 95 % | HEART RATE: 74 BPM | DIASTOLIC BLOOD PRESSURE: 68 MMHG | BODY MASS INDEX: 23.41 KG/M2 | HEIGHT: 61.5 IN | WEIGHT: 125.63 LBS | SYSTOLIC BLOOD PRESSURE: 124 MMHG

## 2022-08-23 DIAGNOSIS — R07.89 CHEST WALL PAIN: Primary | ICD-10-CM

## 2022-08-23 DIAGNOSIS — R07.89 CHEST WALL PAIN: ICD-10-CM

## 2022-08-23 PROCEDURE — 99213 OFFICE O/P EST LOW 20 MIN: CPT | Performed by: FAMILY MEDICINE

## 2022-08-23 PROCEDURE — 90715 TDAP VACCINE 7 YRS/> IM: CPT | Performed by: FAMILY MEDICINE

## 2022-08-23 PROCEDURE — 3078F DIAST BP <80 MM HG: CPT | Performed by: FAMILY MEDICINE

## 2022-08-23 PROCEDURE — 3074F SYST BP LT 130 MM HG: CPT | Performed by: FAMILY MEDICINE

## 2022-08-23 PROCEDURE — 3008F BODY MASS INDEX DOCD: CPT | Performed by: FAMILY MEDICINE

## 2022-08-23 PROCEDURE — 71101 X-RAY EXAM UNILAT RIBS/CHEST: CPT | Performed by: FAMILY MEDICINE

## 2022-08-23 PROCEDURE — 90471 IMMUNIZATION ADMIN: CPT | Performed by: FAMILY MEDICINE

## 2022-09-26 DIAGNOSIS — E78.5 HYPERLIPIDEMIA, UNSPECIFIED HYPERLIPIDEMIA TYPE: ICD-10-CM

## 2022-09-26 DIAGNOSIS — E03.9 HYPOTHYROIDISM, UNSPECIFIED TYPE: ICD-10-CM

## 2022-09-26 DIAGNOSIS — Z00.00 ENCOUNTER FOR ANNUAL HEALTH EXAMINATION: Primary | ICD-10-CM

## 2022-09-26 RX ORDER — ATORVASTATIN CALCIUM 20 MG/1
20 TABLET, FILM COATED ORAL NIGHTLY
Qty: 90 TABLET | Refills: 0 | Status: SHIPPED | OUTPATIENT
Start: 2022-09-26

## 2022-09-26 NOTE — TELEPHONE ENCOUNTER
Last visit- 08/23/2022 chest wall pain     Last refill- 01/06/2022 atorvastatin 20mg QTY90 2R    Last labs- 10/22/2021 tsh, lipid, cmp, cbc    No future appointments.

## 2022-09-26 NOTE — TELEPHONE ENCOUNTER
Please call patient to schedule Supervisit. Fasting lab orders are at [facility].

## 2022-10-06 NOTE — TELEPHONE ENCOUNTER
Future Appointments   Date Time Provider Rakel Naikisti   10/17/2022  1:30 PM Gonzalo Collins,  [facility]

## 2022-10-13 LAB
ABSOLUTE BASOPHILS: 37 CELLS/UL (ref 0–200)
ABSOLUTE EOSINOPHILS: 174 CELLS/UL (ref 15–500)
ABSOLUTE LYMPHOCYTES: 1928 CELLS/UL (ref 850–3900)
ABSOLUTE MONOCYTES: 422 CELLS/UL (ref 200–950)
ABSOLUTE NEUTROPHILS: 3639 CELLS/UL (ref 1500–7800)
ALBUMIN/GLOBULIN RATIO: 1.8 (CALC) (ref 1–2.5)
ALBUMIN: 4.3 G/DL (ref 3.6–5.1)
ALKALINE PHOSPHATASE: 60 U/L (ref 37–153)
ALT: 21 U/L (ref 6–29)
AST: 19 U/L (ref 10–35)
BASOPHILS: 0.6 %
BILIRUBIN, TOTAL: 1 MG/DL (ref 0.2–1.2)
BUN: 18 MG/DL (ref 7–25)
CALCIUM: 9.7 MG/DL (ref 8.6–10.4)
CARBON DIOXIDE: 29 MMOL/L (ref 20–32)
CHLORIDE: 103 MMOL/L (ref 98–110)
CHOL/HDLC RATIO: 2.2 (CALC)
CHOLESTEROL, TOTAL: 171 MG/DL
CREATININE: 0.74 MG/DL (ref 0.6–1)
EGFR: 87 ML/MIN/1.73M2
EOSINOPHILS: 2.8 %
GLOBULIN: 2.4 G/DL (CALC) (ref 1.9–3.7)
GLUCOSE: 88 MG/DL (ref 65–99)
HDL CHOLESTEROL: 79 MG/DL
HEMATOCRIT: 41.3 % (ref 35–45)
HEMOGLOBIN: 13.4 G/DL (ref 11.7–15.5)
LDL-CHOLESTEROL: 76 MG/DL (CALC)
LYMPHOCYTES: 31.1 %
MCH: 30.2 PG (ref 27–33)
MCHC: 32.4 G/DL (ref 32–36)
MCV: 93.2 FL (ref 80–100)
MONOCYTES: 6.8 %
MPV: 11 FL (ref 7.5–12.5)
NEUTROPHILS: 58.7 %
NON-HDL CHOLESTEROL: 92 MG/DL (CALC)
PLATELET COUNT: 234 THOUSAND/UL (ref 140–400)
POTASSIUM: 4.2 MMOL/L (ref 3.5–5.3)
PROTEIN, TOTAL: 6.7 G/DL (ref 6.1–8.1)
RDW: 12 % (ref 11–15)
RED BLOOD CELL COUNT: 4.43 MILLION/UL (ref 3.8–5.1)
SODIUM: 138 MMOL/L (ref 135–146)
TRIGLYCERIDES: 78 MG/DL
TSH W/REFLEX TO FT4: 1.35 MIU/L (ref 0.4–4.5)
WHITE BLOOD CELL COUNT: 6.2 THOUSAND/UL (ref 3.8–10.8)

## 2022-10-17 ENCOUNTER — OFFICE VISIT (OUTPATIENT)
Dept: INTERNAL MEDICINE CLINIC | Facility: CLINIC | Age: 72
End: 2022-10-17
Payer: MEDICARE

## 2022-10-17 VITALS
DIASTOLIC BLOOD PRESSURE: 62 MMHG | WEIGHT: 128.63 LBS | SYSTOLIC BLOOD PRESSURE: 124 MMHG | BODY MASS INDEX: 23.98 KG/M2 | HEIGHT: 61.5 IN | HEART RATE: 72 BPM | OXYGEN SATURATION: 96 %

## 2022-10-17 DIAGNOSIS — Z00.00 ENCOUNTER FOR ANNUAL HEALTH EXAMINATION: Primary | ICD-10-CM

## 2022-10-17 DIAGNOSIS — E03.9 HYPOTHYROIDISM, UNSPECIFIED TYPE: ICD-10-CM

## 2022-10-17 DIAGNOSIS — R93.1 ELEVATED CORONARY ARTERY CALCIUM SCORE: ICD-10-CM

## 2022-10-17 DIAGNOSIS — J44.9 CHRONIC OBSTRUCTIVE PULMONARY DISEASE, UNSPECIFIED COPD TYPE (HCC): ICD-10-CM

## 2022-10-17 DIAGNOSIS — M85.80 OSTEOPENIA, UNSPECIFIED LOCATION: ICD-10-CM

## 2022-10-17 DIAGNOSIS — E78.5 HYPERLIPIDEMIA, UNSPECIFIED HYPERLIPIDEMIA TYPE: ICD-10-CM

## 2023-01-12 ENCOUNTER — HOSPITAL ENCOUNTER (OUTPATIENT)
Dept: CT IMAGING | Age: 73
Discharge: HOME | End: 2023-01-12
Attending: OBSTETRICS & GYNECOLOGY

## 2023-01-12 DIAGNOSIS — Z12.31 OTHER SCREENING MAMMOGRAM: ICD-10-CM

## 2023-01-12 PROCEDURE — 77063 BREAST TOMOSYNTHESIS BI: CPT

## 2023-01-20 ENCOUNTER — TELEPHONE (OUTPATIENT)
Dept: ORTHOPEDICS CLINIC | Facility: CLINIC | Age: 73
End: 2023-01-20

## 2023-01-20 DIAGNOSIS — M54.50 BILATERAL LOW BACK PAIN WITHOUT SCIATICA, UNSPECIFIED CHRONICITY: Primary | ICD-10-CM

## 2023-01-20 NOTE — TELEPHONE ENCOUNTER
NPT scheduled with Dr. Darya Perez for lower back pain. No recent imaging in epic.      Future Appointments   Date Time Provider [facility]   1/27/2023  8:30 AM Sparkle Darling MD [facility]

## 2023-01-20 NOTE — TELEPHONE ENCOUNTER
Reviewed patients chart, xray orders are required. Order placed for lumbar spine xrays.  Please contact patient advise to arrive 30 mins prior to patients appt to complete x-ray order and schedule patients xray appt-Thank you

## 2023-01-24 ENCOUNTER — HOSPITAL ENCOUNTER (OUTPATIENT)
Dept: GENERAL RADIOLOGY | Age: 73
Discharge: HOME | End: 2023-01-24
Attending: STUDENT IN AN ORGANIZED HEALTH CARE EDUCATION/TRAINING PROGRAM
Payer: MEDICARE

## 2023-01-24 DIAGNOSIS — M54.50 BILATERAL LOW BACK PAIN WITHOUT SCIATICA, UNSPECIFIED CHRONICITY: ICD-10-CM

## 2023-01-24 PROCEDURE — 72100 X-RAY EXAM L-S SPINE 2/3 VWS: CPT | Performed by: STUDENT IN AN ORGANIZED HEALTH CARE EDUCATION/TRAINING PROGRAM

## 2023-01-27 ENCOUNTER — OFFICE VISIT (OUTPATIENT)
Dept: ORTHOPEDICS CLINIC | Facility: CLINIC | Age: 73
End: 2023-01-27
Payer: MEDICARE

## 2023-01-27 VITALS — HEIGHT: 61 IN | WEIGHT: 128 LBS | BODY MASS INDEX: 24.17 KG/M2

## 2023-01-27 DIAGNOSIS — M41.9 SCOLIOSIS OF LUMBAR SPINE, UNSPECIFIED SCOLIOSIS TYPE: Primary | ICD-10-CM

## 2023-01-27 PROCEDURE — 3008F BODY MASS INDEX DOCD: CPT | Performed by: STUDENT IN AN ORGANIZED HEALTH CARE EDUCATION/TRAINING PROGRAM

## 2023-01-27 PROCEDURE — 99204 OFFICE O/P NEW MOD 45 MIN: CPT | Performed by: STUDENT IN AN ORGANIZED HEALTH CARE EDUCATION/TRAINING PROGRAM

## 2023-01-27 PROCEDURE — 1125F AMNT PAIN NOTED PAIN PRSNT: CPT | Performed by: STUDENT IN AN ORGANIZED HEALTH CARE EDUCATION/TRAINING PROGRAM

## 2023-02-02 RX ORDER — ATORVASTATIN CALCIUM 20 MG/1
TABLET, FILM COATED ORAL
Qty: 90 TABLET | Refills: 2 | Status: SHIPPED | OUTPATIENT
Start: 2023-02-02

## 2023-02-13 ENCOUNTER — OFFICE VISIT (OUTPATIENT)
Dept: PAIN CLINIC | Facility: CLINIC | Age: 73
End: 2023-02-13
Payer: MEDICARE

## 2023-02-13 VITALS
HEART RATE: 72 BPM | BODY MASS INDEX: 24 KG/M2 | DIASTOLIC BLOOD PRESSURE: 90 MMHG | WEIGHT: 128 LBS | SYSTOLIC BLOOD PRESSURE: 130 MMHG | OXYGEN SATURATION: 98 %

## 2023-02-13 DIAGNOSIS — M47.816 LUMBAR FACET ARTHROPATHY: Primary | ICD-10-CM

## 2023-02-13 PROCEDURE — 3075F SYST BP GE 130 - 139MM HG: CPT | Performed by: ANESTHESIOLOGY

## 2023-02-13 PROCEDURE — 3080F DIAST BP >= 90 MM HG: CPT | Performed by: ANESTHESIOLOGY

## 2023-02-13 PROCEDURE — 99204 OFFICE O/P NEW MOD 45 MIN: CPT | Performed by: ANESTHESIOLOGY

## 2023-02-27 ENCOUNTER — HOSPITAL ENCOUNTER (OUTPATIENT)
Dept: MRI IMAGING | Facility: HOSPITAL | Age: 73
Discharge: HOME | End: 2023-02-27
Attending: ANESTHESIOLOGY
Payer: MEDICARE

## 2023-02-27 DIAGNOSIS — M47.816 LUMBAR FACET ARTHROPATHY: ICD-10-CM

## 2023-02-27 PROCEDURE — 72148 MRI LUMBAR SPINE W/O DYE: CPT | Performed by: ANESTHESIOLOGY

## 2023-03-15 ENCOUNTER — TELEPHONE (OUTPATIENT)
Dept: NEUROLOGY | Facility: CLINIC | Age: 73
End: 2023-03-15

## 2023-03-15 ENCOUNTER — OFFICE VISIT (OUTPATIENT)
Dept: PAIN CLINIC | Facility: CLINIC | Age: 73
End: 2023-03-15
Payer: MEDICARE

## 2023-03-15 VITALS — DIASTOLIC BLOOD PRESSURE: 70 MMHG | SYSTOLIC BLOOD PRESSURE: 110 MMHG | HEART RATE: 77 BPM | OXYGEN SATURATION: 96 %

## 2023-03-15 DIAGNOSIS — M47.819 FACET ARTHROPATHY: Primary | ICD-10-CM

## 2023-03-15 DIAGNOSIS — M47.816 LUMBAR FACET ARTHROPATHY: Primary | ICD-10-CM

## 2023-03-15 DIAGNOSIS — M43.06 SPONDYLOLYSIS, LUMBAR REGION: ICD-10-CM

## 2023-03-15 PROCEDURE — 99214 OFFICE O/P EST MOD 30 MIN: CPT | Performed by: ANESTHESIOLOGY

## 2023-03-15 PROCEDURE — 3074F SYST BP LT 130 MM HG: CPT | Performed by: ANESTHESIOLOGY

## 2023-03-15 PROCEDURE — 3078F DIAST BP <80 MM HG: CPT | Performed by: ANESTHESIOLOGY

## 2023-03-15 NOTE — TELEPHONE ENCOUNTER
Prior authorization request completed for: Bilateral L4, L5 facet injections   Authorization # [id number]  Authorization dates: 3/15/2023 - 9/11/2023  CPT codes approved: 41014 / 90329   Number of visits/dates of service approved: 1  Physician: Dr. Lux Maddox   Location: [facility]    Patient can be scheduled. Routed to Navigator.

## 2023-03-15 NOTE — PROGRESS NOTES
Patient presents in office today with reported pain in low back     Current pain level reported = 2/10    Last reported dose of nothing      Narcotic Contract renewal na    Urine Drug screen na

## 2023-03-23 ENCOUNTER — APPOINTMENT (OUTPATIENT)
Dept: GENERAL RADIOLOGY | Facility: HOSPITAL | Age: 73
End: 2023-03-23
Attending: ANESTHESIOLOGY
Payer: MEDICARE

## 2023-03-23 ENCOUNTER — HOSPITAL ENCOUNTER (OUTPATIENT)
Facility: HOSPITAL | Age: 73
Setting detail: HOSPITAL OUTPATIENT SURGERY
Discharge: HOME | End: 2023-03-23
Attending: ANESTHESIOLOGY | Admitting: ANESTHESIOLOGY
Payer: MEDICARE

## 2023-03-23 VITALS
RESPIRATION RATE: 18 BRPM | HEART RATE: 70 BPM | HEIGHT: 61 IN | OXYGEN SATURATION: 100 % | WEIGHT: 128 LBS | TEMPERATURE: 98 F | DIASTOLIC BLOOD PRESSURE: 85 MMHG | BODY MASS INDEX: 24.17 KG/M2 | SYSTOLIC BLOOD PRESSURE: 177 MMHG

## 2023-03-23 PROCEDURE — 3E0U3BZ INTRODUCTION OF ANESTHETIC AGENT INTO JOINTS, PERCUTANEOUS APPROACH: ICD-10-PCS | Performed by: ANESTHESIOLOGY

## 2023-03-23 PROCEDURE — 64494 INJ PARAVERT F JNT L/S 2 LEV: CPT | Performed by: ANESTHESIOLOGY

## 2023-03-23 PROCEDURE — 3E0U33Z INTRODUCTION OF ANTI-INFLAMMATORY INTO JOINTS, PERCUTANEOUS APPROACH: ICD-10-PCS | Performed by: ANESTHESIOLOGY

## 2023-03-23 PROCEDURE — 64493 INJ PARAVERT F JNT L/S 1 LEV: CPT | Performed by: ANESTHESIOLOGY

## 2023-03-23 PROCEDURE — 64495 INJ PARAVERT F JNT L/S 3 LEV: CPT | Performed by: ANESTHESIOLOGY

## 2023-03-23 RX ORDER — INSULIN ASPART 100 [IU]/ML
3 INJECTION, SOLUTION INTRAVENOUS; SUBCUTANEOUS ONCE
OUTPATIENT
Start: 2023-03-23 | End: 2023-03-23

## 2023-03-23 RX ORDER — BUPIVACAINE HYDROCHLORIDE 5 MG/ML
INJECTION, SOLUTION EPIDURAL; INTRACAUDAL
Status: DISCONTINUED | OUTPATIENT
Start: 2023-03-23 | End: 2023-03-23

## 2023-03-23 RX ORDER — LIDOCAINE HYDROCHLORIDE 10 MG/ML
INJECTION, SOLUTION EPIDURAL; INFILTRATION; INTRACAUDAL; PERINEURAL
Status: DISCONTINUED | OUTPATIENT
Start: 2023-03-23 | End: 2023-03-23

## 2023-03-23 RX ORDER — METHYLPREDNISOLONE ACETATE 40 MG/ML
INJECTION, SUSPENSION INTRA-ARTICULAR; INTRALESIONAL; INTRAMUSCULAR; SOFT TISSUE
Status: DISCONTINUED | OUTPATIENT
Start: 2023-03-23 | End: 2023-03-23

## 2023-03-23 RX ORDER — DEXTROSE MONOHYDRATE 25 G/50ML
50 INJECTION, SOLUTION INTRAVENOUS
OUTPATIENT
Start: 2023-03-23

## 2023-03-23 RX ORDER — NICOTINE POLACRILEX 4 MG
15 LOZENGE BUCCAL
OUTPATIENT
Start: 2023-03-23

## 2023-03-23 RX ORDER — NICOTINE POLACRILEX 4 MG
30 LOZENGE BUCCAL
OUTPATIENT
Start: 2023-03-23

## 2023-03-23 NOTE — OPERATIVE REPORT
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 Procedure performed: LUMBAR FACET INJECTION BILATERAL with local (L3-4, L4-5, L5-S1)    Anesthesia: Local     EBL: Less than 1 ml. Procedure Description:   After reviewing the patient's history and performing a focused physical examination, the diagnosis was confirmed and contraindications such as infection and coagulopathy were ruled out. Following review of potential side effects and complications, including but not necessarily limited to infection, allergic reaction, local tissue breakdown, nerve injury, and paresis, the patient indicated they understood and agreed to proceed. After obtaining the informed consent, the patient was brought to the procedure room and monitored. In the prone position, following sterile prep and drape of the lumbar region, the corresponding facet joints were identified under fluoroscopy. The skin was anesthetized via 25-gauge 1.5\" needle with approximately 2 mL of 1% lidocaine. A 22-gauge 3.5\" Quincke spinal needle was introduced and advanced into the left L3, L4, L5 levels atraumatically under fluoroscopic guidance. Following negative aspiration for CSF and blood, approximately 1 mL of 1% lidocaine with 5 mg of methylprednisolone was injected into each joint without complication. The needle was withdrawn with stylet in situ after being flushed with 0.5 mL lidocaine. The contralateral injections were performed in the similar fashion. The patient tolerated procedure very well.   The patient was observed until discharge criteria met. Discharge instructions were given and patient was released to a responsible adult. Complications: None. Follow up: The patient was followed in the pain clinic as needed basis.       Everett Lantigua MD

## 2023-03-23 NOTE — DISCHARGE INSTRUCTIONS
Home Care Instructions Following Your Pain Procedure     Luke Medina,  It has been a pleasure to have you as our patient. To help you at home, you must follow these general discharge instructions. We will review these with you before you are discharged. It is our hope that you have a complete and uneventful recovery from our procedure. General Instructions:  What to Expect:  Bandages from your procedure today can be removed when you get home. Please avoid soaking and/or swimming for 24 hours. Showering is okay  It is normal to have increased pain symptoms and/or pain at injection site for up to 3-5 days after procedure, you can use heat or ice (20 minutes on 20 minutes off) for comfort. You may experience some temporary side effects which may include restlessness or insomnia, flushing of the face, or heart palpitations. Please contact the provider if these symptoms do not resolve within 3-4 days. Lightheadedness or nausea may occur and should resolve within 24 to 48 hours. If you develop a headache after treatment, rest, drink fluids (with caffeine, if possible) and take mild over-the-counter pain medication. If the headache does not improve with the above treatment, contact the physician. Home Medications:  Resume all previously prescribed medication. Please avoid taking NSAIDs (Non-Steriodal Anti-Inflammatory Drugs) such as:  Ibuprofen ( Advil, Motrin) Aleve (Naproxen), Diclofenac, Meloxicam for 6 hours after procedure. If you are on Coumadin (Warfarin) or any other anti-coagulant (or \"blood thinning\") medication such as Plavix (Clopidogrel), Xarelto (Rivaroxaban), Eliquis (Apixaban), Effient (Prasugrel) etc., restart on the following day from the procedure unless otherwise directed by your provider. If you are a diabetic, please increase the frequency of your glucose monitoring after the procedure as steroids may cause a temporary (2-3 day) increase in your blood sugar.   Contact your primary care physician if your blood sugar remains elevated as you may require some medication adjustment. Diet:  Resume your regular diet as tolerated. Activity: We recommend that you relax and rest during the rest of your procedure day. If you feel weakness in your arms or legs do not drive. Follow-up Appointment  Please schedule a follow-up visit within 3 to 4 weeks after your last procedure date. Question or Concerns:  Feel free to call our office with any questions or concerns at [phone] (option #2)    Gail Clark  Thank you for coming to [facility] for your procedure. The nurses try very hard to make sure you receive the best care possible. Your trust in them as well as us is greatly appreciated.     Thanks so much,   Dr. Lyndon Engle

## 2023-03-23 NOTE — OPERATIVE REPORT
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 Procedure performed: LUMBAR FACET INJECTION BILATERAL with local    Anesthesia: Local and IV Sedation. EBL: Less than 1 ml. Procedure Description:  After reviewing the patient's history and performing a focused physical examination, the diagnosis and positive previous diagnostic tests were confirmed and contraindications such as infection and coagulopathy were ruled out. Following review of allergies and potential side effects and complications, including but not necessarily limited to infection, allergic reaction, local tissue breakdown, nerve injury, post-dural puncture headache and paresis, the patient consented for the procedure. The patient was brought to the procedure room in prone position. After comprehensive monitors were applied and IV access in the patient's arm, moderate intravenous sedation was given with versed and fentanyl. Moderate sedation was given for 6 minutes. After sterile prep of the lumbar spine, the L5-S1 interspace was identified with the help of fluoroscopy. Local anesthetic was given by a 25 gauge 1.5 inch needle with 1% lidocaine in that space level. Thereafter, a 20 gauge Tuohy needle was introduced and advanced under fluoroscopy. The epidural space was accessed by using loss of resistance to air technique. The needle position was confirmed with AP and lateral view under fluoroscopy. Omnipaque 180 was injected in 1 mL volume. A good epidurogram was obtained.   Thereafter, dexamethasone 10 mg with normal saline of 5 mL in total volume of 6 mL was injected through the Tuohy needle. The needle was flushed with 1 mL lidocaine. The needle was withdrawn with the stylet intact in situ. The needle's tip was intact. The patient tolerated the procedure very well without significant immediate complication. The patient's back was cleaned and sterile dressing was applied. The patient was discharged with an instruction to a responsible adult after discharge criteria were met. The patient was advised to contact us should any problems happen. The patient was also informed to go to the emergency room immediately if experiencing severe numbness or weakness in the extremities or experiencing bowel or bladder incontinence. Complications: None. Follow up: The patient was followed in the pain clinic as needed basis.           Terri De La Vega MD

## 2023-03-24 ENCOUNTER — TELEPHONE (OUTPATIENT)
Dept: PAIN CLINIC | Facility: CLINIC | Age: 73
End: 2023-03-24

## 2023-03-24 NOTE — TELEPHONE ENCOUNTER
Salo called placed to patient for post procedure follow up. Patient stated :\" im doing well\"no questions not concerns. Informed patient that soreness is to be expected after the procedure. Educated patient that it takes 3-5 days for the steroid to be effective and to allow adequate time for medication to work. Encouraged patient to alternate ice and heat and to take medications as prescribed. Pt verbalized understanding to call with any questions or concerns.       Procedure: Lumbar facet  Date: 3/23/2023  Follow up Visit Scheduled: 4/7/2023 with

## 2023-04-07 ENCOUNTER — OFFICE VISIT (OUTPATIENT)
Dept: PAIN CLINIC | Facility: CLINIC | Age: 73
End: 2023-04-07
Payer: MEDICARE

## 2023-04-07 VITALS
SYSTOLIC BLOOD PRESSURE: 112 MMHG | OXYGEN SATURATION: 97 % | HEART RATE: 76 BPM | WEIGHT: 128 LBS | DIASTOLIC BLOOD PRESSURE: 68 MMHG | BODY MASS INDEX: 24 KG/M2

## 2023-04-07 DIAGNOSIS — M47.816 LUMBAR FACET ARTHROPATHY: ICD-10-CM

## 2023-04-07 DIAGNOSIS — M53.3 SI (SACROILIAC) PAIN: Primary | ICD-10-CM

## 2023-04-07 PROCEDURE — 3078F DIAST BP <80 MM HG: CPT | Performed by: ANESTHESIOLOGY

## 2023-04-07 PROCEDURE — 3074F SYST BP LT 130 MM HG: CPT | Performed by: ANESTHESIOLOGY

## 2023-04-07 PROCEDURE — 99214 OFFICE O/P EST MOD 30 MIN: CPT | Performed by: ANESTHESIOLOGY

## 2023-04-07 NOTE — PROGRESS NOTES
Last procedure: LUMBAR FACET INJECTION BILATERAL with local  Date: 03/23/23  Percentage of relief obtained: 95%  Duration of relief: couple of days, 50% currently    Current Pain Score: 1-2

## 2023-04-13 ENCOUNTER — PATIENT MESSAGE (OUTPATIENT)
Dept: PAIN CLINIC | Facility: CLINIC | Age: 73
End: 2023-04-13

## 2023-04-13 NOTE — TELEPHONE ENCOUNTER
From: Michael Garcia  To: Swathi Kulkarni MD  Sent: 4/13/2023 12:04 PM CDT  Subject: Insurance approval for injection    Have you submitted a request for approval for an injection in my SI joint? I saw Dr. Salma Payne on Friday 4/7.     Rhea Godinez

## 2023-04-14 ENCOUNTER — TELEPHONE (OUTPATIENT)
Dept: PAIN CLINIC | Facility: CLINIC | Age: 73
End: 2023-04-14

## 2023-04-14 DIAGNOSIS — M53.3 SI (SACROILIAC) PAIN: Primary | ICD-10-CM

## 2023-04-14 RX ORDER — ATORVASTATIN CALCIUM 20 MG/1
20 TABLET, FILM COATED ORAL NIGHTLY
Qty: 100 TABLET | Refills: 1 | Status: SHIPPED | OUTPATIENT
Start: 2023-04-14

## 2023-04-14 NOTE — TELEPHONE ENCOUNTER
Received a faxed requesting additional information on test complete on exam. I called [phone] and was transferred to the Peer to Peer. I spoke with Keyanna Roberts. He wanted to know which test the patient had done on exam he reviewed notes and patient has three Positive Lorra Charles and and SI joint compression. He approval injection [id number]. SI joint injection ([id number]) approval #[id number] at [facility] outpatient from 4/14/23-10/11/23    Ok to schedule.

## 2023-04-24 ENCOUNTER — TELEPHONE (OUTPATIENT)
Dept: INTERNAL MEDICINE CLINIC | Facility: CLINIC | Age: 73
End: 2023-04-24

## 2023-04-24 NOTE — TELEPHONE ENCOUNTER
dewey pt scheduled Abdominal discomfort. We asked pt to call the office to let us know how long she has been having this discomfort. Pt stated for over a month possible ulcer and has seen AMS before for this. Stated not urgent and she can wait till her appt.    Future Appointments   Date Time Provider Rakel Grace   4/26/2023 11:30 AM Larry Mejia DO [facility]

## 2023-04-24 NOTE — TELEPHONE ENCOUNTER
Last seen in clinic that I see documentation regarding 8/23/2022. Scheduled in 2 days with symptoms over one month in duration.  Sending to ams if anything needed from clinical.

## 2023-04-25 ENCOUNTER — HOSPITAL ENCOUNTER (OUTPATIENT)
Facility: HOSPITAL | Age: 73
Setting detail: HOSPITAL OUTPATIENT SURGERY
Discharge: HOME | End: 2023-04-25
Attending: ANESTHESIOLOGY | Admitting: ANESTHESIOLOGY
Payer: MEDICARE

## 2023-04-25 ENCOUNTER — APPOINTMENT (OUTPATIENT)
Dept: GENERAL RADIOLOGY | Facility: HOSPITAL | Age: 73
End: 2023-04-25
Attending: ANESTHESIOLOGY
Payer: MEDICARE

## 2023-04-25 VITALS
SYSTOLIC BLOOD PRESSURE: 128 MMHG | TEMPERATURE: 98 F | RESPIRATION RATE: 16 BRPM | HEART RATE: 76 BPM | OXYGEN SATURATION: 99 % | DIASTOLIC BLOOD PRESSURE: 75 MMHG

## 2023-04-25 PROCEDURE — 3E0U3BZ INTRODUCTION OF ANESTHETIC AGENT INTO JOINTS, PERCUTANEOUS APPROACH: ICD-10-PCS | Performed by: ANESTHESIOLOGY

## 2023-04-25 PROCEDURE — 3E0U33Z INTRODUCTION OF ANTI-INFLAMMATORY INTO JOINTS, PERCUTANEOUS APPROACH: ICD-10-PCS | Performed by: ANESTHESIOLOGY

## 2023-04-25 PROCEDURE — 27096 INJECT SACROILIAC JOINT: CPT | Performed by: ANESTHESIOLOGY

## 2023-04-25 RX ORDER — NICOTINE POLACRILEX 4 MG
15 LOZENGE BUCCAL
Status: DISCONTINUED | OUTPATIENT
Start: 2023-04-25 | End: 2023-04-25

## 2023-04-25 RX ORDER — BUPIVACAINE HYDROCHLORIDE 5 MG/ML
INJECTION, SOLUTION EPIDURAL; INTRACAUDAL
Status: DISCONTINUED | OUTPATIENT
Start: 2023-04-25 | End: 2023-04-25

## 2023-04-25 RX ORDER — DEXTROSE MONOHYDRATE 25 G/50ML
50 INJECTION, SOLUTION INTRAVENOUS
Status: DISCONTINUED | OUTPATIENT
Start: 2023-04-25 | End: 2023-04-25

## 2023-04-25 RX ORDER — NICOTINE POLACRILEX 4 MG
30 LOZENGE BUCCAL
Status: DISCONTINUED | OUTPATIENT
Start: 2023-04-25 | End: 2023-04-25

## 2023-04-25 RX ORDER — INSULIN ASPART 100 [IU]/ML
3 INJECTION, SOLUTION INTRAVENOUS; SUBCUTANEOUS ONCE
Status: DISCONTINUED | OUTPATIENT
Start: 2023-04-25 | End: 2023-04-25

## 2023-04-25 RX ORDER — METHYLPREDNISOLONE ACETATE 40 MG/ML
INJECTION, SUSPENSION INTRA-ARTICULAR; INTRALESIONAL; INTRAMUSCULAR; SOFT TISSUE
Status: DISCONTINUED | OUTPATIENT
Start: 2023-04-25 | End: 2023-04-25

## 2023-04-25 RX ORDER — LIDOCAINE HYDROCHLORIDE 10 MG/ML
INJECTION, SOLUTION EPIDURAL; INFILTRATION; INTRACAUDAL; PERINEURAL
Status: DISCONTINUED | OUTPATIENT
Start: 2023-04-25 | End: 2023-04-25

## 2023-04-25 NOTE — OPERATIVE REPORT
[facility]  Operative Report  2023     [address] Patient Status:  Hospital Outpatient Surgery    1950 MRN [id number]   Location [address] [address] Attending Gary Banks MD   Hosp Day # 0 PCP Ava Segura DO     Indication: Luke Medina is a 81year old female with SI joint pain    Preoperative Diagnosis:  SI (sacroiliac) pain [M53.3]    Postoperative Diagnosis: Same as above. Procedure performed: SACROILIAC JOINT INJECTION  LEFT with local     Anesthesia: Local     EBL: Less than 1 ml. Procedure Description:  After reviewing the patient's history and performing a focused physical examination, the diagnosis was confirmed and contraindications such as infection and coagulopathy were ruled out. Following review of potential side effects and complications, including but not necessarily limited to infection, allergic reaction, local tissue breakdown, nerve injury, and paresis, the patient indicated they understood and agreed to proceed. After obtaining the informed consent, the patient was brought to the procedure room and monitored. The patient was placed prone on the procedure table. The lumbar and sacral areas were prepped and draped in sterile fashion. Subcutaneous lidocaine was instilled into the superficial soft tissues for local anesthesia. Under fluoroscopic guidance, the anterior and posterior aspects of the sacroiliac joint were overlapped. A 22-gauge, Quincke spinal needle was advanced into the middle portion of the corresponding sacroiliac joint. After the needle  positions were confirmed by AP and lateral views of fluoroscopy, 1 cc Omnipaque-240 was injected into the sacroiliac joint. There was a nice sacroiliac joint arthrogram revealed. After that   methylprednisolone 40 mg with 2 mL of 1% lidocaine was injected. The needle was flushed with 1 mL of 1% lidocaine. The needle was removed with stylet in situ.   The patient tolerated the procedure very well. There was no subjective or objective loss of motor strength. The patient was observed until discharge criteria met. Discharge instructions were given and patient was released to a responsible adult. Complications: None. Follow up: The patient was followed in the pain clinic as needed basis.       Lexus Flores MD

## 2023-04-25 NOTE — DISCHARGE INSTRUCTIONS
Home Care Instructions Following Your Pain Procedure     Per Brush,  It has been a pleasure to have you as our patient. To help you at home, you must follow these general discharge instructions. We will review these with you before you are discharged. It is our hope that you have a complete and uneventful recovery from our procedure. General Instructions:  What to Expect:  Bandages from your procedure today can be removed when you get home. Please avoid soaking and/or swimming for 24 hours. Showering is okay  It is normal to have increased pain symptoms and/or pain at injection site for up to 3-5 days after procedure, you can use heat or ice (20 minutes on 20 minutes off) for comfort. You may experience some temporary side effects which may include restlessness or insomnia, flushing of the face, or heart palpitations. Please contact the provider if these symptoms do not resolve within 3-4 days. Lightheadedness or nausea may occur and should resolve within 24 to 48 hours. If you develop a headache after treatment, rest, drink fluids (with caffeine, if possible) and take mild over-the-counter pain medication. If the headache does not improve with the above treatment, contact the physician. Home Medications:  Resume all previously prescribed medication. Please avoid taking NSAIDs (Non-Steriodal Anti-Inflammatory Drugs) such as:  Ibuprofen ( Advil, Motrin) Aleve (Naproxen), Diclofenac, Meloxicam for 6 hours after procedure. If you are on Coumadin (Warfarin) or any other anti-coagulant (or \"blood thinning\") medication such as Plavix (Clopidogrel), Xarelto (Rivaroxaban), Eliquis (Apixaban), Effient (Prasugrel) etc., restart on the following day from the procedure unless otherwise directed by your provider. If you are a diabetic, please increase the frequency of your glucose monitoring after the procedure as steroids may cause a temporary (2-3 day) increase in your blood sugar.   Contact your primary care physician if your blood sugar remains elevated as you may require some medication adjustment. Diet:  Resume your regular diet as tolerated. Activity: We recommend that you relax and rest during the rest of your procedure day. If you feel weakness in your arms or legs do not drive. Follow-up Appointment  Please schedule a follow-up visit within 3 to 4 weeks after your last procedure date. Question or Concerns:  Feel free to call our office with any questions or concerns at [phone] (option #2)    Yogesh Khan  Thank you for coming to [facility] for your procedure. The nurses try very hard to make sure you receive the best care possible. Your trust in them as well as us is greatly appreciated.     Thanks so much,   Dr. Maulik Ag

## 2023-04-26 ENCOUNTER — OFFICE VISIT (OUTPATIENT)
Dept: INTERNAL MEDICINE CLINIC | Facility: CLINIC | Age: 73
End: 2023-04-26
Payer: MEDICARE

## 2023-04-26 VITALS
OXYGEN SATURATION: 98 % | SYSTOLIC BLOOD PRESSURE: 110 MMHG | HEART RATE: 82 BPM | WEIGHT: 129 LBS | HEIGHT: 61.5 IN | BODY MASS INDEX: 24.04 KG/M2 | DIASTOLIC BLOOD PRESSURE: 70 MMHG

## 2023-04-26 DIAGNOSIS — Z90.49 HISTORY OF CHOLECYSTECTOMY: ICD-10-CM

## 2023-04-26 DIAGNOSIS — R00.2 PALPITATIONS: ICD-10-CM

## 2023-04-26 DIAGNOSIS — R10.11 RUQ PAIN: ICD-10-CM

## 2023-04-26 DIAGNOSIS — R06.09 DYSPNEA ON EXERTION: Primary | ICD-10-CM

## 2023-04-26 PROCEDURE — 3078F DIAST BP <80 MM HG: CPT | Performed by: FAMILY MEDICINE

## 2023-04-26 PROCEDURE — 3074F SYST BP LT 130 MM HG: CPT | Performed by: FAMILY MEDICINE

## 2023-04-26 PROCEDURE — 1125F AMNT PAIN NOTED PAIN PRSNT: CPT | Performed by: FAMILY MEDICINE

## 2023-04-26 PROCEDURE — 99214 OFFICE O/P EST MOD 30 MIN: CPT | Performed by: FAMILY MEDICINE

## 2023-04-26 PROCEDURE — 3008F BODY MASS INDEX DOCD: CPT | Performed by: FAMILY MEDICINE

## 2023-05-06 ENCOUNTER — HOSPITAL ENCOUNTER (OUTPATIENT)
Dept: CV DIAGNOSTICS | Facility: HOSPITAL | Age: 73
Discharge: HOME | End: 2023-05-06
Attending: FAMILY MEDICINE
Payer: MEDICARE

## 2023-05-06 ENCOUNTER — HOSPITAL ENCOUNTER (OUTPATIENT)
Dept: GENERAL RADIOLOGY | Facility: HOSPITAL | Age: 73
Discharge: HOME | End: 2023-05-06
Attending: FAMILY MEDICINE
Payer: MEDICARE

## 2023-05-06 ENCOUNTER — EKG ENCOUNTER (OUTPATIENT)
Dept: LAB | Facility: HOSPITAL | Age: 73
End: 2023-05-06
Attending: FAMILY MEDICINE
Payer: MEDICARE

## 2023-05-06 DIAGNOSIS — R06.09 DYSPNEA ON EXERTION: ICD-10-CM

## 2023-05-06 DIAGNOSIS — R00.2 PALPITATIONS: ICD-10-CM

## 2023-05-06 PROCEDURE — 93005 ELECTROCARDIOGRAM TRACING: CPT

## 2023-05-06 PROCEDURE — 93306 TTE W/DOPPLER COMPLETE: CPT | Performed by: FAMILY MEDICINE

## 2023-05-06 PROCEDURE — 71046 X-RAY EXAM CHEST 2 VIEWS: CPT | Performed by: FAMILY MEDICINE

## 2023-05-06 PROCEDURE — 93010 ELECTROCARDIOGRAM REPORT: CPT | Performed by: INTERNAL MEDICINE

## 2023-05-07 LAB
ATRIAL RATE: 72 BPM
P AXIS: 42 DEGREES
P-R INTERVAL: 146 MS
Q-T INTERVAL: 370 MS
QRS DURATION: 78 MS
QTC CALCULATION (BEZET): 405 MS
R AXIS: 59 DEGREES
T AXIS: 46 DEGREES
VENTRICULAR RATE: 72 BPM

## 2023-05-10 ENCOUNTER — OFFICE VISIT (OUTPATIENT)
Dept: PAIN CLINIC | Facility: CLINIC | Age: 73
End: 2023-05-10
Payer: MEDICARE

## 2023-05-10 VITALS — SYSTOLIC BLOOD PRESSURE: 122 MMHG | DIASTOLIC BLOOD PRESSURE: 70 MMHG | HEART RATE: 52 BPM | OXYGEN SATURATION: 100 %

## 2023-05-10 DIAGNOSIS — M53.3 SI (SACROILIAC) PAIN: Primary | ICD-10-CM

## 2023-05-10 DIAGNOSIS — M47.816 LUMBAR FACET ARTHROPATHY: ICD-10-CM

## 2023-05-10 PROCEDURE — 3078F DIAST BP <80 MM HG: CPT | Performed by: ANESTHESIOLOGY

## 2023-05-10 PROCEDURE — 99214 OFFICE O/P EST MOD 30 MIN: CPT | Performed by: ANESTHESIOLOGY

## 2023-05-10 PROCEDURE — 1159F MED LIST DOCD IN RCRD: CPT | Performed by: ANESTHESIOLOGY

## 2023-05-10 PROCEDURE — 3074F SYST BP LT 130 MM HG: CPT | Performed by: ANESTHESIOLOGY

## 2023-05-17 ENCOUNTER — HOSPITAL ENCOUNTER (OUTPATIENT)
Dept: ULTRASOUND IMAGING | Age: 73
Discharge: HOME | End: 2023-05-17
Attending: FAMILY MEDICINE
Payer: MEDICARE

## 2023-05-17 DIAGNOSIS — R10.11 RUQ PAIN: ICD-10-CM

## 2023-05-17 PROCEDURE — 76700 US EXAM ABDOM COMPLETE: CPT | Performed by: FAMILY MEDICINE

## 2023-05-18 ENCOUNTER — PATIENT MESSAGE (OUTPATIENT)
Dept: INTERNAL MEDICINE CLINIC | Facility: CLINIC | Age: 73
End: 2023-05-18

## 2023-05-18 NOTE — TELEPHONE ENCOUNTER
From: Reji Simms  To: Wilbert Cazares DO  Sent: 5/18/2023 3:49 PM CDT  Subject: Ultrasound    Is there any follow up for the cyst? I understand it is \"tiny\", but. .. Reginald Schuster

## 2023-06-14 ENCOUNTER — TELEPHONE (OUTPATIENT)
Dept: INTERNAL MEDICINE CLINIC | Facility: CLINIC | Age: 73
End: 2023-06-14

## 2023-06-14 NOTE — TELEPHONE ENCOUNTER
Pt called asking for a appt today AMS does not have anything today but triage pt says side of neck feels inflamed and it is extremely uncomfortable [phone]

## 2023-06-14 NOTE — TELEPHONE ENCOUNTER
Neck is stiff and top towards bottom of jaw is tight and swollen only on the right side [phone]

## 2023-06-15 ENCOUNTER — TELEPHONE (OUTPATIENT)
Dept: INTERNAL MEDICINE CLINIC | Facility: CLINIC | Age: 73
End: 2023-06-15

## 2023-06-15 ENCOUNTER — HOSPITAL ENCOUNTER (OUTPATIENT)
Dept: ULTRASOUND IMAGING | Facility: HOSPITAL | Age: 73
Discharge: HOME | End: 2023-06-15
Attending: FAMILY MEDICINE
Payer: MEDICARE

## 2023-06-15 ENCOUNTER — OFFICE VISIT (OUTPATIENT)
Dept: INTERNAL MEDICINE CLINIC | Facility: CLINIC | Age: 73
End: 2023-06-15
Payer: MEDICARE

## 2023-06-15 VITALS
SYSTOLIC BLOOD PRESSURE: 138 MMHG | DIASTOLIC BLOOD PRESSURE: 82 MMHG | BODY MASS INDEX: 23.48 KG/M2 | HEIGHT: 61.5 IN | OXYGEN SATURATION: 98 % | HEART RATE: 122 BPM | RESPIRATION RATE: 22 BRPM | WEIGHT: 126 LBS

## 2023-06-15 DIAGNOSIS — R22.1 LOCALIZED SWELLING, MASS AND LUMP, NECK: Primary | ICD-10-CM

## 2023-06-15 DIAGNOSIS — R22.1 LOCALIZED SWELLING, MASS AND LUMP, NECK: ICD-10-CM

## 2023-06-15 PROCEDURE — 3075F SYST BP GE 130 - 139MM HG: CPT | Performed by: FAMILY MEDICINE

## 2023-06-15 PROCEDURE — 1160F RVW MEDS BY RX/DR IN RCRD: CPT | Performed by: FAMILY MEDICINE

## 2023-06-15 PROCEDURE — 76536 US EXAM OF HEAD AND NECK: CPT | Performed by: FAMILY MEDICINE

## 2023-06-15 PROCEDURE — 3008F BODY MASS INDEX DOCD: CPT | Performed by: FAMILY MEDICINE

## 2023-06-15 PROCEDURE — 1159F MED LIST DOCD IN RCRD: CPT | Performed by: FAMILY MEDICINE

## 2023-06-15 PROCEDURE — 1170F FXNL STATUS ASSESSED: CPT | Performed by: FAMILY MEDICINE

## 2023-06-15 PROCEDURE — 3079F DIAST BP 80-89 MM HG: CPT | Performed by: FAMILY MEDICINE

## 2023-06-15 PROCEDURE — 99214 OFFICE O/P EST MOD 30 MIN: CPT | Performed by: FAMILY MEDICINE

## 2023-06-15 NOTE — TELEPHONE ENCOUNTER
JAYA    Spoke to pt briefly after clocking out yesterday (6/14), end of day, as pt walked into the waiting room- therefore no further appointments were available to offer for the day. Pt was upset that she called twice and had not been called back yet. Apologized to pt and let her know calls are still being made by another nurse and unfortunately we cannot get to every call at one time (the patient's initial call to the office was made at 4:07PM, current time was 5:03pm). She reports swelling to her right upper neck area. No redness, injury, or fever. No resp sx or illness sx. Did not discuss symptoms in detail- recommended urgent care evaluation, as her right neck did appear inflamed. She declined and decided to schedule on Friday. Advised if symptoms worsen, she develops redness, resp sx, or a fever- she should be go to immediate care. Pt would not make eye contact- reiterated warnings.

## 2023-06-16 LAB
ABSOLUTE BASOPHILS: 41 CELLS/UL (ref 0–200)
ABSOLUTE EOSINOPHILS: 112 CELLS/UL (ref 15–500)
ABSOLUTE LYMPHOCYTES: 1233 CELLS/UL (ref 850–3900)
ABSOLUTE MONOCYTES: 437 CELLS/UL (ref 200–950)
ABSOLUTE NEUTROPHILS: 4077 CELLS/UL (ref 1500–7800)
BASOPHILS: 0.7 %
EOSINOPHILS: 1.9 %
HEMATOCRIT: 38.7 % (ref 35–45)
HEMOGLOBIN: 13.2 G/DL (ref 11.7–15.5)
LYMPHOCYTES: 20.9 %
MCH: 31.5 PG (ref 27–33)
MCHC: 34.1 G/DL (ref 32–36)
MCV: 92.4 FL (ref 80–100)
MONOCYTES: 7.4 %
MPV: 11.3 FL (ref 7.5–12.5)
NEUTROPHILS: 69.1 %
PLATELET COUNT: 229 THOUSAND/UL (ref 140–400)
RDW: 12.5 % (ref 11–15)
RED BLOOD CELL COUNT: 4.19 MILLION/UL (ref 3.8–5.1)
WHITE BLOOD CELL COUNT: 5.9 THOUSAND/UL (ref 3.8–10.8)

## 2023-07-24 RX ORDER — OMEPRAZOLE 20 MG/1
20 CAPSULE, DELAYED RELEASE ORAL
Qty: 90 CAPSULE | Refills: 3 | Status: SHIPPED | OUTPATIENT
Start: 2023-07-24

## 2023-07-24 NOTE — TELEPHONE ENCOUNTER
Last OV  6/15/23 ACUTE    Last PE  6/17/22  Last REFILL   OMEPRAZOLE 20 MG Oral Capsule Delayed Release 90 capsule 3 8/8/2022     Last LABS 6/16/2023 CBC     No future appointments. Per PROTOCOL? NO PROTOCOL.   Please Advise

## 2023-07-28 ENCOUNTER — TELEPHONE (OUTPATIENT)
Dept: INTERNAL MEDICINE CLINIC | Facility: CLINIC | Age: 73
End: 2023-07-28

## 2023-07-28 DIAGNOSIS — E78.5 HYPERLIPIDEMIA, UNSPECIFIED HYPERLIPIDEMIA TYPE: ICD-10-CM

## 2023-07-28 DIAGNOSIS — E03.9 HYPOTHYROIDISM, UNSPECIFIED TYPE: ICD-10-CM

## 2023-07-28 DIAGNOSIS — Z00.00 ROUTINE GENERAL MEDICAL EXAMINATION AT A HEALTH CARE FACILITY: Primary | ICD-10-CM

## 2023-07-28 NOTE — TELEPHONE ENCOUNTER
Patient [place] going [place] and is wondering if she can get a prescription for Paxlovid.

## 2023-07-29 NOTE — TELEPHONE ENCOUNTER
Rx sent. Remind patient to hold Atorvastatin while taking and must be started within 5 days of symptom start (if she ends up needing to take it).

## 2023-07-31 NOTE — TELEPHONE ENCOUNTER
Patient notified Annamaria Mansfield has sent Paxlovid script to her pharmacy, notified she will need to hold Atorvastatin while taking Paxlovid and Paxlovid must be started within 5 days of symptoms start if she ends up needing to take it. Pt verbalizes understanding.

## 2023-10-03 LAB
ABSOLUTE BASOPHILS: 42 CELLS/UL (ref 0–200)
ABSOLUTE EOSINOPHILS: 170 CELLS/UL (ref 15–500)
ABSOLUTE LYMPHOCYTES: 1691 CELLS/UL (ref 850–3900)
ABSOLUTE MONOCYTES: 403 CELLS/UL (ref 200–950)
ABSOLUTE NEUTROPHILS: 2995 CELLS/UL (ref 1500–7800)
ALBUMIN/GLOBULIN RATIO: 1.8 (CALC) (ref 1–2.5)
ALBUMIN: 4.2 G/DL (ref 3.6–5.1)
ALKALINE PHOSPHATASE: 63 U/L (ref 37–153)
ALT: 22 U/L (ref 6–29)
AST: 19 U/L (ref 10–35)
BASOPHILS: 0.8 %
BILIRUBIN, TOTAL: 0.7 MG/DL (ref 0.2–1.2)
BUN: 18 MG/DL (ref 7–25)
CALCIUM: 9.4 MG/DL (ref 8.6–10.4)
CARBON DIOXIDE: 31 MMOL/L (ref 20–32)
CHLORIDE: 104 MMOL/L (ref 98–110)
CHOL/HDLC RATIO: 2.3 (CALC)
CHOLESTEROL, TOTAL: 180 MG/DL
CREATININE: 0.79 MG/DL (ref 0.6–1)
EGFR: 80 ML/MIN/1.73M2
EOSINOPHILS: 3.2 %
GLOBULIN: 2.4 G/DL (CALC) (ref 1.9–3.7)
GLUCOSE: 89 MG/DL (ref 65–99)
HDL CHOLESTEROL: 79 MG/DL
HEMATOCRIT: 43.8 % (ref 35–45)
HEMOGLOBIN: 14 G/DL (ref 11.7–15.5)
LDL-CHOLESTEROL: 85 MG/DL (CALC)
LYMPHOCYTES: 31.9 %
MCH: 30.4 PG (ref 27–33)
MCHC: 32 G/DL (ref 32–36)
MCV: 95 FL (ref 80–100)
MONOCYTES: 7.6 %
MPV: 11 FL (ref 7.5–12.5)
NEUTROPHILS: 56.5 %
NON-HDL CHOLESTEROL: 101 MG/DL (CALC)
PLATELET COUNT: 239 THOUSAND/UL (ref 140–400)
POTASSIUM: 5 MMOL/L (ref 3.5–5.3)
PROTEIN, TOTAL: 6.6 G/DL (ref 6.1–8.1)
RDW: 12 % (ref 11–15)
RED BLOOD CELL COUNT: 4.61 MILLION/UL (ref 3.8–5.1)
SODIUM: 141 MMOL/L (ref 135–146)
TRIGLYCERIDES: 73 MG/DL
TSH W/REFLEX TO FT4: 1.08 MIU/L (ref 0.4–4.5)
WHITE BLOOD CELL COUNT: 5.3 THOUSAND/UL (ref 3.8–10.8)

## 2023-10-17 RX ORDER — ATORVASTATIN CALCIUM 20 MG/1
20 TABLET, FILM COATED ORAL NIGHTLY
Qty: 100 TABLET | Refills: 2 | Status: SHIPPED | OUTPATIENT
Start: 2023-10-17

## 2023-10-17 NOTE — TELEPHONE ENCOUNTER
Jing Cabrera MD  6/15/2023  3:41 PM CDT       Discussed with patient. Will check CBC. Likely post-inflammatory LAD. Discussed monitoring for resolution and follow-up if not improving over the next few weeks.          Closing encounter

## 2023-10-17 NOTE — TELEPHONE ENCOUNTER
Last OV 6/15/23   Last PE 10/17/22   Last REFILL  4/14/23  Last LABS cbc cmp lipid 10/2/23     Future Appointments   Date Time Provider Rakel Grace   10/18/2023  7:30 AM Jimena Avelar,  [facility]         Per PROTOCOL? Cholesterol Medication Protocol [id number]/[id number]/2023 02:19 PM   Protocol Details ALT < 80    ALT resulted within past year    Lipid panel within past 12 months    Appointment within past 12 or next 3 months     Please Advise?

## 2023-10-18 ENCOUNTER — OFFICE VISIT (OUTPATIENT)
Dept: INTERNAL MEDICINE CLINIC | Facility: CLINIC | Age: 73
End: 2023-10-18
Payer: MEDICARE

## 2023-10-18 VITALS
RESPIRATION RATE: 18 BRPM | HEIGHT: 61.25 IN | DIASTOLIC BLOOD PRESSURE: 72 MMHG | OXYGEN SATURATION: 96 % | HEART RATE: 76 BPM | SYSTOLIC BLOOD PRESSURE: 130 MMHG | WEIGHT: 123 LBS | BODY MASS INDEX: 22.92 KG/M2

## 2023-10-18 DIAGNOSIS — H52.4 PRESBYOPIA OF BOTH EYES: ICD-10-CM

## 2023-10-18 DIAGNOSIS — Z00.00 ENCOUNTER FOR ANNUAL HEALTH EXAMINATION: Primary | ICD-10-CM

## 2023-10-18 DIAGNOSIS — J44.9 CHRONIC OBSTRUCTIVE PULMONARY DISEASE, UNSPECIFIED COPD TYPE (HCC): ICD-10-CM

## 2023-10-18 DIAGNOSIS — M85.80 OSTEOPENIA, UNSPECIFIED LOCATION: ICD-10-CM

## 2023-10-18 DIAGNOSIS — R00.2 PALPITATIONS: ICD-10-CM

## 2023-10-18 DIAGNOSIS — E78.5 HYPERLIPIDEMIA, UNSPECIFIED HYPERLIPIDEMIA TYPE: ICD-10-CM

## 2023-10-18 DIAGNOSIS — R93.1 ELEVATED CORONARY ARTERY CALCIUM SCORE: ICD-10-CM

## 2023-10-18 DIAGNOSIS — E03.9 HYPOTHYROIDISM, UNSPECIFIED TYPE: ICD-10-CM

## 2023-10-18 PROBLEM — M47.816 LUMBAR FACET ARTHROPATHY: Status: RESOLVED | Noted: 2023-02-13 | Resolved: 2023-10-18

## 2023-10-18 PROBLEM — M53.3 SI (SACROILIAC) PAIN: Status: RESOLVED | Noted: 2023-04-07 | Resolved: 2023-10-18

## 2023-10-18 PROCEDURE — 3075F SYST BP GE 130 - 139MM HG: CPT | Performed by: FAMILY MEDICINE

## 2023-10-18 PROCEDURE — 1160F RVW MEDS BY RX/DR IN RCRD: CPT | Performed by: FAMILY MEDICINE

## 2023-10-18 PROCEDURE — 3008F BODY MASS INDEX DOCD: CPT | Performed by: FAMILY MEDICINE

## 2023-10-18 PROCEDURE — 99213 OFFICE O/P EST LOW 20 MIN: CPT | Performed by: FAMILY MEDICINE

## 2023-10-18 PROCEDURE — 96160 PT-FOCUSED HLTH RISK ASSMT: CPT | Performed by: FAMILY MEDICINE

## 2023-10-18 PROCEDURE — 1170F FXNL STATUS ASSESSED: CPT | Performed by: FAMILY MEDICINE

## 2023-10-18 PROCEDURE — 3078F DIAST BP <80 MM HG: CPT | Performed by: FAMILY MEDICINE

## 2023-10-18 PROCEDURE — G0439 PPPS, SUBSEQ VISIT: HCPCS | Performed by: FAMILY MEDICINE

## 2023-10-18 PROCEDURE — 1159F MED LIST DOCD IN RCRD: CPT | Performed by: FAMILY MEDICINE

## 2023-10-18 PROCEDURE — 1126F AMNT PAIN NOTED NONE PRSNT: CPT | Performed by: FAMILY MEDICINE

## 2023-12-28 DIAGNOSIS — M85.89 OTHER SPECIFIED DISORDERS OF BONE DENSITY AND STRUCTURE, MULTIPLE SITES: ICD-10-CM

## 2023-12-28 DIAGNOSIS — Z12.31 VISIT FOR SCREENING MAMMOGRAM: Primary | ICD-10-CM

## 2024-01-16 ENCOUNTER — HOSPITAL ENCOUNTER (OUTPATIENT)
Dept: CT IMAGING | Age: 74
Discharge: HOME | End: 2024-01-16
Attending: OBSTETRICS & GYNECOLOGY

## 2024-01-16 ENCOUNTER — HOSPITAL ENCOUNTER (OUTPATIENT)
Dept: GENERAL RADIOLOGY | Age: 74
Discharge: HOME | End: 2024-01-16
Attending: OBSTETRICS & GYNECOLOGY

## 2024-01-16 DIAGNOSIS — Z12.31 VISIT FOR SCREENING MAMMOGRAM: ICD-10-CM

## 2024-01-16 DIAGNOSIS — M85.89 OTHER SPECIFIED DISORDERS OF BONE DENSITY AND STRUCTURE, MULTIPLE SITES: ICD-10-CM

## 2024-01-16 PROCEDURE — 77063 BREAST TOMOSYNTHESIS BI: CPT

## 2024-01-16 PROCEDURE — 77080 DXA BONE DENSITY AXIAL: CPT

## 2024-07-16 RX ORDER — OMEPRAZOLE 20 MG/1
20 CAPSULE, DELAYED RELEASE ORAL
Qty: 90 CAPSULE | Refills: 3 | Status: SHIPPED | OUTPATIENT
Start: 2024-07-16

## 2024-07-16 NOTE — TELEPHONE ENCOUNTER
Refill passed per [facility] protocol.  Requested Prescriptions   Pending Prescriptions Disp Refills    OMEPRAZOLE 20 MG Oral Capsule Delayed Release [Pharmacy Med Name: Omeprazole Dr 20 Mg Cap Nort] 90 capsule 0     Sig: TAKE ONE TABLET BY MOUTH EVERY MORNING BEFORE BREAKFAST       Gastrointestional Medication Protocol Passed - 7/12/2024 10:18 AM        Passed - In person appointment or virtual visit in the past 12 mos or appointment in next 3 mos     Recent Outpatient Visits              9 months ago Encounter for annual health examination    [address]    Office Visit    1 year ago Localized swelling, mass and lump, neck    [address] Ramses Chong MD    Office Visit    1 year [facility]    [facility]    1 year ago Dyspnea on exertion    [address]    Office Visit    1 year ago SI (sacroiliac) pain    [facility] Shun Arce MD    Office Visit          Future Appointments         Provider Department Appt Notes    In 2 months Kendra Bailey DO [address] -[phone]                       Recent Outpatient Visits              9 months ago Encounter for annual health examination    [address] Kendra Sebastian DO    Office Visit    1 year ago Localized swelling, mass and lump, neck    [address] Ramses Chong MD    Office Visit    1 year ago SI (sacroiliac) pain    [facility]    [facility] Visit    1 year ago Dyspnea on exertion    [address] Lela Bailey  DO Kendra    Office Visit    1 year ago SI (sacroiliac) pain    [facility] Shun Ponce MD    Office Visit          Future Appointments         Provider Department Appt Notes    In 2 months Kendra Bailey DO [facility], [address] [place], [place] [id number]

## 2024-07-29 ENCOUNTER — PATIENT MESSAGE (OUTPATIENT)
Dept: INTERNAL MEDICINE CLINIC | Facility: CLINIC | Age: 74
End: 2024-07-29

## 2024-07-30 NOTE — TELEPHONE ENCOUNTER
From: Autumn Gonsalez  To: Kendra Bailey  Sent: 7/29/2024 10:22 AM CDT  Subject: Paxlovid    Dr. Bailey,  Rip and I are going to [place] and [place] on Aug.31 for 16 days. Would you please write each of a prescription for Paxlovid to take with us?   Since we have both gotten Covid on our last 2 trips to [place], I think it would behoove us to take some along just in case.    Thanks,  Lori Gonsalez

## 2024-07-31 NOTE — TELEPHONE ENCOUNTER
Rx sent. Remind patient to only use if tests positive and only if she can start within first 5 days of symptoms. Also remind patient to hold Atorvastatin while taking. I sent Rx to [facility] because her [facility] states they do not carry Paxlovid.

## 2024-08-18 RX ORDER — ATORVASTATIN CALCIUM 20 MG/1
20 TABLET, FILM COATED ORAL NIGHTLY
Qty: 100 TABLET | Refills: 3 | Status: SHIPPED | OUTPATIENT
Start: 2024-08-18

## 2024-08-18 NOTE — TELEPHONE ENCOUNTER
Refill passed per [facility] protocol.    Requested Prescriptions   Pending Prescriptions Disp Refills    ATORVASTATIN 20 MG Oral Tab [Pharmacy Med Name: Atorvastatin Calcium 20 Mg Tab Nort] 100 tablet 0     Sig: Take 1 tablet (20 mg total) by mouth nightly.       Cholesterol Medication Protocol Passed - 8/14/2024  9:39 AM        Passed - ALT < 80     Lab Results   Component Value Date    ALT 22 10/02/2023             Passed - ALT resulted within past year        Passed - Lipid panel within past 12 months     Lab Results   Component Value Date    CHOLEST 180 10/02/2023    TRIG 73 10/02/2023    HDL 79 10/02/2023    LDL 85 10/02/2023    VLDL 16 10/05/2016    TCHDLRATIO 2.3 10/02/2023    NONHDLC 101 10/02/2023             Passed - In person appointment or virtual visit in the past 12 mos or appointment in next 3 mos     Recent Outpatient Visits              10 months ago Encounter for annual health examination    [address] Kendra Bailey DO    Office Visit    1 year ago Localized swelling, mass and lump, neck    [address]    Office Visit    1 year ago SI (sacroiliac) pain    [facility] Shun Ponce MD    Office Visit    1 year ago Dyspnea on exertion    [address]    Office Visit    1 year ago [facility]sacroiliac[facility]    [facility]          Future Appointments         Provider Department Appt Notes    In 1 month Kendra Bailey DO [address] -[phone]                         Recent Outpatient Visits              10 months ago Encounter for annual health examination    [address]    Office Visit    1 year  ago Localized swelling, mass and lump, neck    [facility], [address] [place] [address] Ramses Wells MD    Office Visit    1 year ago SI (sacroiliac) pain    [facility] Shun Arce MD    Office Visit    1 year ago Dyspnea on exertion    [address] Kendra Bailey DO    Office Visit    1 year ago SI (sacroiliac) pain    [facility] Shun Arce MD    Office Visit            Future Appointments         Provider Department Appt Notes    In 1 month Kendra Bailey DO [address] -[phone]

## 2024-09-04 ENCOUNTER — TELEPHONE (OUTPATIENT)
Dept: INTERNAL MEDICINE CLINIC | Facility: CLINIC | Age: 74
End: 2024-09-04

## 2024-09-04 DIAGNOSIS — Z00.00 ENCOUNTER FOR ANNUAL HEALTH EXAMINATION: Primary | ICD-10-CM

## 2024-09-06 ENCOUNTER — TELEPHONE (OUTPATIENT)
Dept: INTERNAL MEDICINE CLINIC | Facility: CLINIC | Age: 74
End: 2024-09-06

## 2024-09-06 DIAGNOSIS — T84.498S FAILED ORTHOPEDIC IMPLANT, SEQUELA: ICD-10-CM

## 2024-09-06 DIAGNOSIS — Z01.818 PREOPERATIVE EXAMINATION: Primary | ICD-10-CM

## 2024-09-06 NOTE — TELEPHONE ENCOUNTER
Future Appointments   Date Time Provider Department Center   10/1/2024  1:00 PM Kendra Bailey,  [facility]   10/23/2024  3:00 PM Kendra Bailey DO [facility]

## 2024-09-06 NOTE — TELEPHONE ENCOUNTER
Medical Clearance Request received at office for DR. NAKUL ADORNO.  Patient will be having foot surgery with Dr. Gerardo Sinclair on 11/14/24 at [facility].    Clearance letter placed in DR. NAKUL ADORNO pre op folder.

## 2024-09-06 NOTE — TELEPHONE ENCOUNTER
Please call patient to schedule pre-op appt with me sometime between 10/15/24- 11/1/24. She needs EKG and labs prior. Lab order in at [facility], EKG order at [place]. Surgery is 11/14/24. 30 mins.

## 2024-09-26 LAB
ABSOLUTE BASOPHILS: 31 CELLS/UL (ref 0–200)
ABSOLUTE EOSINOPHILS: 158 CELLS/UL (ref 15–500)
ABSOLUTE LYMPHOCYTES: 1586 CELLS/UL (ref 850–3900)
ABSOLUTE MONOCYTES: 388 CELLS/UL (ref 200–950)
ABSOLUTE NEUTROPHILS: 2938 CELLS/UL (ref 1500–7800)
ALBUMIN/GLOBULIN RATIO: 1.8 (CALC) (ref 1–2.5)
ALBUMIN: 4.4 G/DL (ref 3.6–5.1)
ALKALINE PHOSPHATASE: 63 U/L (ref 37–153)
ALT: 21 U/L (ref 6–29)
AST: 19 U/L (ref 10–35)
BASOPHILS: 0.6 %
BILIRUBIN, TOTAL: 0.8 MG/DL (ref 0.2–1.2)
BUN: 18 MG/DL (ref 7–25)
CALCIUM: 9.6 MG/DL (ref 8.6–10.4)
CARBON DIOXIDE: 28 MMOL/L (ref 20–32)
CHLORIDE: 104 MMOL/L (ref 98–110)
CHOL/HDLC RATIO: 2.2 (CALC)
CHOLESTEROL, TOTAL: 163 MG/DL
CREATININE: 0.8 MG/DL (ref 0.6–1)
EGFR: 78 ML/MIN/1.73M2
EOSINOPHILS: 3.1 %
GLOBULIN: 2.4 G/DL (CALC) (ref 1.9–3.7)
GLUCOSE: 101 MG/DL (ref 65–99)
HDL CHOLESTEROL: 74 MG/DL
HEMATOCRIT: 42.2 % (ref 35–45)
HEMOGLOBIN: 13.5 G/DL (ref 11.7–15.5)
LDL-CHOLESTEROL: 74 MG/DL (CALC)
LYMPHOCYTES: 31.1 %
MCH: 30.5 PG (ref 27–33)
MCHC: 32 G/DL (ref 32–36)
MCV: 95.3 FL (ref 80–100)
MONOCYTES: 7.6 %
MPV: 11 FL (ref 7.5–12.5)
NEUTROPHILS: 57.6 %
NON-HDL CHOLESTEROL: 89 MG/DL (CALC)
PLATELET COUNT: 281 THOUSAND/UL (ref 140–400)
POTASSIUM: 4.3 MMOL/L (ref 3.5–5.3)
PROTEIN, TOTAL: 6.8 G/DL (ref 6.1–8.1)
RDW: 12.6 % (ref 11–15)
RED BLOOD CELL COUNT: 4.43 MILLION/UL (ref 3.8–5.1)
SODIUM: 139 MMOL/L (ref 135–146)
TRIGLYCERIDES: 74 MG/DL
TSH W/REFLEX TO FT4: 1.04 MIU/L (ref 0.4–4.5)
WHITE BLOOD CELL COUNT: 5.1 THOUSAND/UL (ref 3.8–10.8)

## 2024-10-01 ENCOUNTER — OFFICE VISIT (OUTPATIENT)
Dept: INTERNAL MEDICINE CLINIC | Facility: CLINIC | Age: 74
End: 2024-10-01
Payer: MEDICARE

## 2024-10-01 VITALS
OXYGEN SATURATION: 96 % | WEIGHT: 126 LBS | BODY MASS INDEX: 23.79 KG/M2 | HEART RATE: 77 BPM | SYSTOLIC BLOOD PRESSURE: 114 MMHG | HEIGHT: 61 IN | DIASTOLIC BLOOD PRESSURE: 76 MMHG

## 2024-10-01 DIAGNOSIS — R19.7 DIARRHEA, UNSPECIFIED TYPE: ICD-10-CM

## 2024-10-01 DIAGNOSIS — E78.5 HYPERLIPIDEMIA, UNSPECIFIED HYPERLIPIDEMIA TYPE: ICD-10-CM

## 2024-10-01 DIAGNOSIS — E03.9 HYPOTHYROIDISM, UNSPECIFIED TYPE: ICD-10-CM

## 2024-10-01 DIAGNOSIS — Z00.00 ENCOUNTER FOR ANNUAL HEALTH EXAMINATION: Primary | ICD-10-CM

## 2024-10-01 DIAGNOSIS — J44.9 CHRONIC OBSTRUCTIVE PULMONARY DISEASE, UNSPECIFIED COPD TYPE (HCC): ICD-10-CM

## 2024-10-01 PROCEDURE — 99213 OFFICE O/P EST LOW 20 MIN: CPT | Performed by: FAMILY MEDICINE

## 2024-10-01 PROCEDURE — 96160 PT-FOCUSED HLTH RISK ASSMT: CPT | Performed by: FAMILY MEDICINE

## 2024-10-01 PROCEDURE — G0439 PPPS, SUBSEQ VISIT: HCPCS | Performed by: FAMILY MEDICINE

## 2024-10-01 NOTE — PROGRESS NOTES
Subjective:   Autumn Gonsalez is a 74 year old female who presents for a MA AHA (Medicare Advantage Annual Health Assessment) and Subsequent Annual Wellness visit (Pt already had Initial Annual Wellness) and scheduled follow up of multiple significant but stable problems.   1. Encounter for annual health examination (Primary)- doing well overall. Continues to be physically active.   2. Chronic obstructive pulmonary disease, unspecified COPD type (HCC)- no recent symptoms.   3. Hypothyroidism, unspecified type- taking thyroid med as prescribed with no issues. Sees Endo.  4. Hyperlipidemia, unspecified hyperlipidemia type- taking statin as prescribed with no issues.   5. Diarrhea, unspecified type- started a few days ago. Had been feeling less of an appetite and then with multiple episodes of loose stool per day. No fever. No blood in stool. No sick contacts. Took imodium today.    History/Other:   Fall Risk Assessment:   She has been screened for Falls and is low risk.      Cognitive Assessment:   She had a completely normal cognitive assessment - see flowsheet entries     Functional Ability/Status:   Autumn Gonsalez has some abnormal functions as listed below:  She has Hearing problems based on screening of functional status.She has Vision problems based on screening of functional status.       Depression Screening (PHQ):  PHQ-2 SCORE: 0  , done 9/24/2024             Advanced Directives:   She does have a Living Will but we do NOT have it on file in Epic.    She does have a POA but we do NOT have it on file in [employer].    Patient has Advance Care Planning documents but we do not have a copy in EMR. Discussed Advanced Care Planning with patient and instructed patient to get our office a copy to be scanned into EMR.      Patient Active Problem List   Diagnosis    COPD (chronic obstructive pulmonary disease) (HCC)    Osteopenia    Hypothyroidism    Elevated coronary artery calcium score    Hyperlipidemia     Presbyopia of both eyes     Allergies:  She is allergic to dust mites, geocillin [carbenicillin], mold spores, and penicillins.    Current Medications:  Outpatient Medications Marked as Taking for the 10/1/24 encounter (Office Visit) with Kendra Bailey, DO   Medication Sig    atorvastatin 20 MG Oral Tab Take 1 tablet (20 mg total) by mouth nightly.    omeprazole 20 MG Oral Capsule Delayed Release Take 1 capsule (20 mg total) by mouth before breakfast.    Estradiol 10 MCG Vaginal Tab Place 10 mcg vaginally twice a week.    OIL OF OREGANO OR Take by mouth.    levothyroxine 112 MCG Oral Tab Take 1 tablet (112 mcg total) by mouth every other day.    CINNAMON OR Take by mouth.    Coenzyme Q10 (COQ10 OR) Take  by mouth.    levothyroxine 125 MCG Oral Tab Take 1 tablet (125 mcg total) by mouth every other day.    Multiple Vitamins-Minerals (MULTIVITAMIN OR) Take  by mouth.    Omega-3 Fatty Acids (FISH OIL) 1000 MG Oral Capsule Delayed Release Take  by mouth.    Calcium Carbonate-Vitamin D (CALCIUM + D OR) Take  by mouth.    aspirin 81 MG Oral Tab Take 1 tablet (81 mg total) by mouth daily.    Glucosamine-Chondroit-Vit C-Mn (GLUCOSAMINE 1500 COMPLEX OR) Take  by mouth.    Ascorbic Acid (VITAMIN C OR) Take  by mouth.       Medical History:  She  has a past medical history of [id number], & 06929/SX GLOABL RLW/RT ARM/ EXP 05/28/2014 (01/07/2014), AC (acromioclavicular) arthritis (06/03/2013), Arthritis (1999), Back problem, Bicipital tenosynovitis (06/03/2013), Biliary dyskinesia (10/01/2006), Body mass index between 19-24, adult (06/20/2013), Bronchitis (01/01/2014), Carpal tunnel syndrome (01/01/2014), Contracture of palmar fascia (06/20/2013), COPD (chronic obstructive pulmonary disease) ([facility]), Depression (9/95), Disorder of thyroid, Disorders of bursae and tendons in shoulder region, unspecified (06/03/2013), Endometriosis, Esophageal reflux, Frozen shoulder (01/01/2013), Gestational diabetes ([facility]), Hallux rigidus  (2013), Hearing impairment, History of miscarriage, History of palpitations (2012), Hyperlipidemia, Hypothyroidism, Irritable bowel syndrome, Left foot pain (2012), Lesion of ulnar nerve (2014), Lumbar facet arthropathy, Malaise and fatigue (2014), Osteoarthritis, Osteopenia (2013), Pain in joint, ankle and foot (2013), Plantar fasciitis (2008), Pleurisy (2012), Proximal phalanx fracture of finger (2015), Rosacea (2013), Shoulder pain (2013), Tear of medial meniscus of right knee (2010), Thyroid disease, Thyroid nodule (2011), Trigger finger (acquired) (2013), Unspecified hypothyroidism (2013), and Visual impairment.  Surgical History:  She  has a past surgical history that includes cholecystectomy (10/25/2006); cataract (Bilateral, 2010); hysterectomy (1992);  (1989); hand/finger surgery unlisted (2014, 2012, 2005); other surgical history; foot/toes surgery proc unlisted (); hand/finger surgery unlisted (2014); other (2014); tubal ligation (1991); foot/toes surgery proc unlisted (Left, 2013); colonoscopy (, 7/15/2002, 2007); other surgical history (Left, 2004); other surgical history (Bilateral, 1999); foot/toes surgery proc unlisted (Left, 2012); electrocardiogram, complete (2013); other surgical history (2011, 2006); upper gi endoscopy,exam (2006); dental surgery procedure; Breast biopsy; colonoscopy,biopsy (N/A, 2015); carpal tunnel release (Right, 2014); shoulder arthroscopy (10/2013, 2013); correct bunion,othr methods; hand/finger surgery unlisted (Left, 18--Dr. Mccormack); colonoscopy (N/A, 2018); hand/finger surgery unlisted (Right, 20--Dr. Eulalio Mccormack); d & c (87); and  (10/1982; 1985).   Family History:  Her family history includes Cancer in her father; Dementia in her father, maternal  grandfather, and paternal grandfather; Depression in her mother and another family member; Heart Attack (age of onset: 84) in her paternal grandmother; Heart Disease in her maternal grandmother; Heart Disorder in her father; Heart Disorder (age of onset: 50) in her maternal grandmother; Hypertension in her father; Lipids in her father; Lung Disorder in her mother; Musculo-skelatal Disorder in her mother; Other in an other family member; Stroke in her father; Stroke (age of onset: 84) in her maternal grandfather; Thyroid Disorder in her mother.  Social History:  She  reports that she quit smoking about 42 years ago. Her smoking use included cigarettes. She started smoking about 56 years ago. She has a 21 pack-year smoking history. She has never used smokeless tobacco. She reports current alcohol use of about 10.0 standard drinks of alcohol per week. She reports that she does not use drugs.    Tobacco:  She smoked tobacco in the past but quit greater than 12 months ago.  Social History     Tobacco Use   Smoking Status Former    Current packs/day: 0.00    Average packs/day: 1.5 packs/day for 14.0 years (21.0 ttl pk-yrs)    Types: Cigarettes    Start date: 1968    Quit date: 1982    Years since quittin.6   Smokeless Tobacco Never          CAGE Alcohol Screen:   CAGE screening score of 0 on 2024, showing low risk of alcohol abuse.      Patient Care Team:  Kendra Bailey DO as PCP - General    Review of Systems  GENERAL: feels well otherwise  SKIN: denies any unusual skin lesions  EYES: denies blurred vision or double vision  HEENT: denies nasal congestion, sinus pain or ST  LUNGS: denies shortness of breath with exertion  CARDIOVASCULAR: denies chest pain on exertion  GI: denies abdominal pain, denies heartburn, some recent diarrhea  : denies dysuria, vaginal discharge or itching, no complaint of urinary incontinence   MUSCULOSKELETAL: denies back pain  NEURO: denies headaches  PSYCHE: denies  depression or anxiety  HEMATOLOGIC: denies hx of anemia  ENDOCRINE: denies thyroid history  ALL/ASTHMA: denies hx of allergy or asthma    Objective:   Physical Exam  General Appearance:  Alert, cooperative, no distress, appears stated age   Head:  Normocephalic, without obvious abnormality, atraumatic   Eyes:  PERRL, conjunctiva/corneas clear, EOM's intact both eyes   Ears:  Normal TM's and external ear canals, both ears   Nose: Nares normal, septum midline,mucosa normal, no drainage or sinus tenderness   Throat: Lips, mucosa, and tongue normal; teeth and gums normal   Neck: Supple, symmetrical, trachea midline, no adenopathy;  thyroid: not enlarged, symmetric, no tenderness/mass/nodules; no carotid bruit or JVD   Back:   Symmetric, no curvature, ROM normal, no CVA tenderness   Lungs:   Clear to auscultation bilaterally, respirations unlabored   Heart:  Regular rate and rhythm, S1 and S2 normal, no murmur, rub, or gallop       Pelvic: Deferred   Extremities: Extremities normal, atraumatic, no cyanosis or edema       Skin: Skin color, texture, turgor normal, no rashes or lesions   Lymph nodes: Cervical, supraclavicular, and axillary nodes normal   Neurologic: Normal       /76   Pulse 77   Ht 5' 1\" (1.549 m)   Wt 126 lb (57.2 kg)   SpO2 96%   BMI 23.81 kg/m²  Estimated body mass index is 23.81 kg/m² as calculated from the following:    Height as of this encounter: 5' 1\" (1.549 m).    Weight as of this encounter: 126 lb (57.2 kg).    Medicare Hearing Assessment:   Hearing Screening    Screening Method: Whisper Test  Whisper Test Result: Pass         Visual Acuity:   Right Eye Visual Acuity: Corrected Right Eye Chart Acuity: 20/15   Left Eye Visual Acuity: Corrected Left Eye Chart Acuity: 20/20   Both Eyes Visual Acuity: Corrected Both Eyes Chart Acuity: 20/20   Able To Tolerate Visual Acuity: Yes        Assessment & Plan:   Autumn Gonsalez is a 74 year old female who presents for a Medicare Assessment.      1. Encounter for annual health examination (Primary)  2. Chronic obstructive pulmonary disease, unspecified COPD type (HCC)  3. Hypothyroidism, unspecified type  4. Hyperlipidemia, unspecified hyperlipidemia type  5. Diarrhea, unspecified type  The patient indicates understanding of these issues and agrees to the plan.  Reinforced healthy diet, lifestyle, and exercise.  1. Encounter for annual health examination (Primary)- Reviewed age-appropriate preventive health and safety recommendations with patient. Reviewed lab results. Encouraged regular exercise and healthy eating.   2. Chronic obstructive pulmonary disease, unspecified COPD type (HCC)- stable. Continue to monitor.   3. Hypothyroidism, unspecified type- TSH within range. Continue current med.   4. Hyperlipidemia, unspecified hyperlipidemia type- controlled on statin. Continue.  5. Diarrhea, unspecified type- Continue Imodium. Limit sugar and dairy until 48 hrs after diarrhea resolved. Keep well-hydrated. Stool studies if lasting 2+ weeks.       Return in about 1 year (around 10/1/2025).     Kendra Bailey DO, 10/1/2024     Supplementary Documentation:   General Health:  In the past six months, have you lost more than 10 pounds without trying?: 2 - No  Has your appetite been poor?: No  Type of Diet: Balanced  How does the patient maintain a good energy level?: Appropriate Exercise  How would you describe your daily physical activity?: Moderate  How would you describe your current health state?: Good  How do you maintain positive mental well-being?: Social Interaction;Puzzles;Games;Visiting Friends  On a scale of 0 to 10, with 0 being no pain and 10 being severe pain, what is your pain level?: 0 - (None)  In the past six months, have you experienced urine leakage?: 1-Yes  At any time do you feel concerned for the safety/well-being of yourself and/or your children, in your home or elsewhere?: No  Have you had any immunizations at another office such as  Influenza, Hepatitis B, Tetanus, or Pneumococcal?: Yes    Health Maintenance   Topic Date Due    MA Annual Health Assessment  01/01/2024    Annual Depression Screening  01/01/2024    COVID-19 Vaccine (8 - 2023-24 season) 09/01/2024    Influenza Vaccine (1) 10/01/2024    Mammogram  01/16/2025    Colorectal Cancer Screening  12/07/2025    DEXA Scan  Completed    Fall Risk Screening (Annual)  Completed    Pneumococcal Vaccine: 65+ Years  Completed    Zoster Vaccines  Completed

## 2024-10-15 ENCOUNTER — EKG ENCOUNTER (OUTPATIENT)
Dept: LAB | Facility: HOSPITAL | Age: 74
End: 2024-10-15
Attending: FAMILY MEDICINE
Payer: MEDICARE

## 2024-10-15 DIAGNOSIS — T84.498S FAILED ORTHOPEDIC IMPLANT, SEQUELA: ICD-10-CM

## 2024-10-15 DIAGNOSIS — Z01.818 PREOPERATIVE EXAMINATION: ICD-10-CM

## 2024-10-15 LAB
ATRIAL RATE: 74 BPM
P AXIS: 27 DEGREES
P-R INTERVAL: 150 MS
Q-T INTERVAL: 376 MS
QRS DURATION: 80 MS
QTC CALCULATION (BEZET): 417 MS
R AXIS: 54 DEGREES
T AXIS: 59 DEGREES
VENTRICULAR RATE: 74 BPM

## 2024-10-15 PROCEDURE — 93010 ELECTROCARDIOGRAM REPORT: CPT | Performed by: INTERNAL MEDICINE

## 2024-10-15 PROCEDURE — 93005 ELECTROCARDIOGRAM TRACING: CPT

## 2024-10-16 ENCOUNTER — TELEPHONE (OUTPATIENT)
Dept: INTERNAL MEDICINE CLINIC | Facility: CLINIC | Age: 74
End: 2024-10-16

## 2024-10-16 ENCOUNTER — OFFICE VISIT (OUTPATIENT)
Dept: INTERNAL MEDICINE CLINIC | Facility: CLINIC | Age: 74
End: 2024-10-16
Payer: MEDICARE

## 2024-10-16 VITALS
WEIGHT: 125.69 LBS | DIASTOLIC BLOOD PRESSURE: 70 MMHG | BODY MASS INDEX: 24 KG/M2 | SYSTOLIC BLOOD PRESSURE: 134 MMHG | HEART RATE: 86 BPM | OXYGEN SATURATION: 92 %

## 2024-10-16 DIAGNOSIS — Z01.818 PREOPERATIVE EXAMINATION: Primary | ICD-10-CM

## 2024-10-16 DIAGNOSIS — T84.498S FAILED ORTHOPEDIC IMPLANT, SEQUELA: ICD-10-CM

## 2024-10-16 LAB
ATRIAL RATE: 75 BPM
P AXIS: 28 DEGREES
P-R INTERVAL: 152 MS
Q-T INTERVAL: 382 MS
QRS DURATION: 78 MS
QTC CALCULATION (BEZET): 426 MS
R AXIS: 67 DEGREES
T AXIS: 57 DEGREES
VENTRICULAR RATE: 75 BPM

## 2024-10-16 PROCEDURE — 93000 ELECTROCARDIOGRAM COMPLETE: CPT | Performed by: FAMILY MEDICINE

## 2024-10-16 PROCEDURE — 99214 OFFICE O/P EST MOD 30 MIN: CPT | Performed by: FAMILY MEDICINE

## 2024-10-16 RX ORDER — CLINDAMYCIN HYDROCHLORIDE 300 MG/1
1 CAPSULE ORAL 3 TIMES DAILY
COMMUNITY
Start: 2024-10-10 | End: 2024-10-17

## 2024-10-16 NOTE — TELEPHONE ENCOUNTER
Please fax my H&P, labs, EKG (all are attached to pre-op paperwork in my folder) to appropriate location. Surgery is tomorrow 10/17/24!

## 2024-10-16 NOTE — PROGRESS NOTES
Subjective:   Patient ID: Autumn Gonsalez is a 74 year old female.    HPI Here for preoperative exam for planned removal of failed hardware right foot scheduled for 10/17/24 with Gerardo Sinclair DPM. Patient has tolerated anesthesia in the past with no complications. No chest pain/shortness of breath with physical activity and regularly expends 4+ METs physical activity.     History/Other:   Past Medical History:    81699,[id number], & 34893/SX GLOABL RLW/RT ARM/ EXP 05/28/2014    AC (acromioclavicular) arthritis    Arthritis    Back problem    advance disc disease    Bicipital tenosynovitis    Biliary dyskinesia    Body mass index between 19-24, adult    Log Date: 11/19/2012     Bronchitis    Carpal tunnel syndrome    carpal and cubital tunnel syndrome as well as long finger trigger    Contracture of palmar fascia    Log Date: 11/19/2012     COPD (chronic obstructive pulmonary disease) (HCC)    mild    Depression    resolved    Disorder of thyroid    Disorders of bursae and tendons in shoulder region, unspecified    Endometriosis    Esophageal reflux    Frozen shoulder    Rt    Gestational diabetes (HCC)    Hallux rigidus    Log Date: 02/05/2013     Hearing impairment    bilateral hearing loss    History of miscarriage    x2    History of palpitations    Hyperlipidemia    Hypothyroidism    Irritable bowel syndrome    Left foot pain    plantar plate tear, second MPJ Left    Lesion of ulnar nerve    Lumbar facet arthropathy    Malaise and fatigue    Osteoarthritis    Rt 1st metatarsophalangeal joint, Rt talonavicular joint, Rt knee    Osteopenia    Pain in joint, ankle and foot    Log Date: 11/03/2011     Plantar fasciitis    biomechanically induced    Pleurisy    Proximal phalanx fracture of finger    Rosacea    Shoulder pain    Tear of medial meniscus of right knee    Thyroid disease    Thyroid nodule    Trigger finger (acquired)    Log Date: 08/16/2011     Unspecified hypothyroidism    Visual impairment     Past  Surgical History:   Procedure Laterality Date    Breast biopsy        1989    Carpal tunnel release Right 2014    Cataract Bilateral 2010    Cholecystectomy  10/25/2006    Colonoscopy  , 7/15/2002, 2007    Colonoscopy N/A 2018    Procedure: COLONOSCOPY, POSSIBLE BIOPSY, POSSIBLE POLYPECTOMY 05739;  Surgeon: Charan Alexandre MD;  Location: [facility]    Colonoscopy,biopsy N/A 2015    Procedure: COLONOSCOPY, POSSIBLE BIOPSY, POSSIBLE POLYPECTOMY 48188;  Surgeon: Charan Alexandre MD;  Location: [facility]    Correct bunion,othr methods      D & c  87    Dental surgery procedure      wisdom teeth removal    Electrocardiogram, complete  2013    Foot/toes surgery proc unlisted      b/l big toe surgery    Foot/toes surgery proc unlisted Left 2013    Toe vick implant and synovectomy    Foot/toes surgery proc unlisted Left 2012    Lt great toe bunionectomy    Hand/finger surgery unlisted  2014, 2012, 2005 2 trigger finger releases all Rt side, 2012 Rt ring finger trigger release 2005 Lt thumb trigger digit release    Hand/finger surgery unlisted  2014    A1 pulley release    Hand/finger surgery unlisted Left 18--Dr. Mccormack    ring finger A1 pulley release/trigger finger release    Hand/finger surgery unlisted Right 20--Dr. Eulalio Mccormack    thumb [facility] joint ligament reconstruction tendon interposition arthroplasty    Hysterectomy  1992    with BSO      10/1982; 1985    Other  2014    right ulnar nerve decompression    Other surgical history      Lt thumb tendon transplant    Other surgical history Left 2004    L arthrotomy & tendon transfer    Other surgical history Bilateral 1999    Cheilectomy    Other surgical history  2011, 2006 US guided thyroid biopsy, 2006 FNA aspiration biopsy Rt & Lt thyroid nodules    Shoulder arthroscopy  10/2013, 2013     R-2013 Rt rotator cuff repair of subscapularis tendon,subacromial decompression, distal clavicle excision, biceps subpectoral mini open tenodesis (Dr Daigle), L-10/2013; bilat rotator cuff and tenodesis    Tubal ligation  1991    Upper gi endoscopy,exam  2006     Social History     Socioeconomic History    Marital status:    Tobacco Use    Smoking status: Former     Current packs/day: 0.00     Average packs/day: 1.5 packs/day for 14.0 years (21.0 ttl pk-yrs)     Types: Cigarettes     Start date: 1968     Quit date: 1982     Years since quittin.7    Smokeless tobacco: Never   Vaping Use    Vaping status: Never Used   Substance and Sexual Activity    Alcohol use: Yes     Alcohol/week: 10.0 standard drinks of alcohol     Types: 10 Glasses of wine per week    Drug use: No   Other Topics Concern    Caffeine Concern No    Exercise No    Seat Belt No    Special Diet No    Stress Concern No    Weight Concern No     Family History   Problem Relation Age of Onset    Cancer Father         lung    Heart Disorder Father     Hypertension Father     Lipids Father         hyperlipidemia    Dementia Father     Stroke Father     Depression Mother     Lung Disorder Mother         emphysema    Thyroid Disorder Mother         hyperthyroidism    Musculo-skelatal Disorder Mother         osteoporosis    Heart Disease Maternal Grandmother     Heart Disorder Maternal Grandmother 50        CHF    Dementia Paternal Grandfather     Heart Attack Paternal Grandmother 84    Dementia Maternal Grandfather     Depression Other         Siblings    Other (Other) Other         hydrocephalus - Uncles    Stroke Maternal Grandfather 84       Review of Systems   Constitutional:  Negative for activity change, appetite change, fatigue and fever.   HENT:  Negative for ear pain, hearing loss and rhinorrhea.    Eyes:  Negative for visual disturbance.   Respiratory:  Negative for cough and shortness of breath.    Cardiovascular:   Negative for chest pain, palpitations and leg swelling.   Gastrointestinal:  Negative for abdominal pain and constipation.   Endocrine: Negative for polydipsia, polyphagia and polyuria.   Genitourinary:  Negative for dysuria and frequency.   Musculoskeletal:  Negative for arthralgias and joint swelling.   Skin:  Negative for rash.   Neurological:  Negative for dizziness, weakness, numbness and headaches.   Hematological:  Negative for adenopathy. Does not bruise/bleed easily.   Psychiatric/Behavioral:  Negative for dysphoric mood. The patient is not nervous/anxious.      Current Outpatient Medications   Medication Sig Dispense Refill    clindamycin 300 MG Oral Cap Take 1 capsule (300 mg total) by mouth 3 (three) times daily.      atorvastatin 20 MG Oral Tab Take 1 tablet (20 mg total) by mouth nightly. 100 tablet 3    omeprazole 20 MG Oral Capsule Delayed Release Take 1 capsule (20 mg total) by mouth before breakfast. 90 capsule 3    Estradiol 10 MCG Vaginal Tab Place 10 mcg vaginally twice a week.      OIL OF OREGANO OR Take by mouth.      levothyroxine 112 MCG Oral Tab Take 1 tablet (112 mcg total) by mouth every other day.      CINNAMON OR Take by mouth.      Coenzyme Q10 (COQ10 OR) Take  by mouth.      levothyroxine 125 MCG Oral Tab Take 1 tablet (125 mcg total) by mouth every other day.      Multiple Vitamins-Minerals (MULTIVITAMIN OR) Take  by mouth.      Omega-3 Fatty Acids (FISH OIL) 1000 MG Oral Capsule Delayed Release Take  by mouth.      Calcium Carbonate-Vitamin D (CALCIUM + D OR) Take  by mouth.      aspirin 81 MG Oral Tab Take 1 tablet (81 mg total) by mouth daily.      Glucosamine-Chondroit-Vit C-Mn (GLUCOSAMINE 1500 COMPLEX OR) Take  by mouth.      Ascorbic Acid (VITAMIN C OR) Take  by mouth.      GINKGO BILOBA by Does not apply route.       Allergies:Allergies[1]    Objective:   Vitals:    10/16/24 1402   BP: 134/70   Pulse: 86   SpO2: 92%   Weight: 125 lb 10.6 oz (57 kg)      Physical Exam  Vitals  reviewed.   Constitutional:       Appearance: Normal appearance. She is well-developed.   HENT:      Head: Normocephalic and atraumatic.      Right Ear: Tympanic membrane, ear canal and external ear normal.      Left Ear: Tympanic membrane, ear canal and external ear normal.      Mouth/Throat:      Pharynx: No posterior oropharyngeal erythema.   Eyes:      Conjunctiva/sclera: Conjunctivae normal.      Pupils: Pupils are equal, round, and reactive to light.   Cardiovascular:      Rate and Rhythm: Normal rate and regular rhythm.      Heart sounds: Normal heart sounds.   Pulmonary:      Effort: Pulmonary effort is normal.      Breath sounds: Normal breath sounds.   Skin:     General: Skin is warm and dry.   Neurological:      Mental Status: She is alert.   Psychiatric:         Behavior: Behavior normal.         Assessment & Plan:   1. Preoperative examination    2. Failed orthopedic implant, sequela    Reviewed EKG and labs. Proceed as planned. Patient is at acceptable risk for surgery.     No orders of the defined types were placed in this encounter.      Meds This Visit:  Requested Prescriptions      No prescriptions requested or ordered in this encounter       Imaging & Referrals:  ELECTROCARDIOGRAM, COMPLETE         [1]   Allergies  Allergen Reactions    Dust Mites     Geocillin [Carbenicillin]     Mold Spores     Penicillins      Sensitivity; Stomach Upset

## 2024-10-17 ENCOUNTER — LAB REQUISITION (OUTPATIENT)
Dept: LAB | Facility: HOSPITAL | Age: 74
End: 2024-10-17
Payer: MEDICARE

## 2024-10-17 DIAGNOSIS — T84.498D OTHER MECHANICAL COMPLICATION OF OTHER INTERNAL ORTHOPEDIC DEVICES, IMPLANTS AND GRAFTS, SUBSEQUENT ENCOUNTER: ICD-10-CM

## 2024-10-17 PROCEDURE — 87077 CULTURE AEROBIC IDENTIFY: CPT | Performed by: PODIATRIST

## 2024-10-17 PROCEDURE — 87205 SMEAR GRAM STAIN: CPT | Performed by: PODIATRIST

## 2024-10-17 PROCEDURE — 87075 CULTR BACTERIA EXCEPT BLOOD: CPT | Performed by: PODIATRIST

## 2024-10-17 PROCEDURE — 87070 CULTURE OTHR SPECIMN AEROBIC: CPT | Performed by: PODIATRIST

## 2024-10-20 ENCOUNTER — PATIENT MESSAGE (OUTPATIENT)
Dept: INTERNAL MEDICINE CLINIC | Facility: CLINIC | Age: 74
End: 2024-10-20

## 2024-11-03 ENCOUNTER — APPOINTMENT (OUTPATIENT)
Dept: CT IMAGING | Facility: HOSPITAL | Age: 74
End: 2024-11-03
Attending: EMERGENCY MEDICINE
Payer: MEDICARE

## 2024-11-03 ENCOUNTER — HOSPITAL ENCOUNTER (EMERGENCY)
Facility: HOSPITAL | Age: 74
Discharge: HOME | End: 2024-11-03
Attending: EMERGENCY MEDICINE
Payer: MEDICARE

## 2024-11-03 VITALS
WEIGHT: 122 LBS | DIASTOLIC BLOOD PRESSURE: 86 MMHG | HEIGHT: 61 IN | RESPIRATION RATE: 17 BRPM | BODY MASS INDEX: 23.03 KG/M2 | OXYGEN SATURATION: 96 % | HEART RATE: 88 BPM | TEMPERATURE: 98 F | SYSTOLIC BLOOD PRESSURE: 154 MMHG

## 2024-11-03 DIAGNOSIS — H53.9 VISUAL DISTURBANCE: Primary | ICD-10-CM

## 2024-11-03 PROCEDURE — 99284 EMERGENCY DEPT VISIT MOD MDM: CPT

## 2024-11-03 PROCEDURE — 70496 CT ANGIOGRAPHY HEAD: CPT | Performed by: EMERGENCY MEDICINE

## 2024-11-03 PROCEDURE — 70498 CT ANGIOGRAPHY NECK: CPT | Performed by: EMERGENCY MEDICINE

## 2024-11-03 RX ORDER — ASPIRIN 81 MG/1
324 TABLET, CHEWABLE ORAL ONCE
Status: COMPLETED | OUTPATIENT
Start: 2024-11-03 | End: 2024-11-03

## 2024-11-03 NOTE — ED INITIAL ASSESSMENT (HPI)
PT with hx of 'floaters' in L eye, states she is seeing flashing lights in L eye since around 1530. She states her eye doctor told her if she should develop these eye flashes to come to ER. Denies seeing eye flashes at this time. PT reports slight headache. Denies numbness/tingling.

## 2024-11-04 ENCOUNTER — TELEPHONE (OUTPATIENT)
Dept: INTERNAL MEDICINE CLINIC | Facility: CLINIC | Age: 74
End: 2024-11-04

## 2024-11-04 NOTE — TELEPHONE ENCOUNTER
LOV 10/16/24   Seen in ER 11/3/24    Pt called. Stated she was in the ER yesterday because she was seeing flashing lights. Pt stated she was advised to alert PCP. Pt feeling fine today, c/o mild headache which pt states it \"routine for her\". No flashing lights in vision today. Pt is going to call ophthalmology once we hang up. Was referred to call Dr. Flores with [facility].     AMS - Do you want to see pt for ER f/u or ok to see ophthalmology for f/u?   
Ok for Ophtho, no need for f/u here.   
Satisfactory

## 2024-11-04 NOTE — ED PROVIDER NOTES
Patient Seen in: [facility] Emergency Department      History     Chief Complaint   Patient presents with    Eye Visual Problem     Stated Complaint: eye flashes    Subjective:   74-year-old female history of floaters in the left eye, presents with \"flashes\" in the left eye.  She was told by her optometrist that she ever gets flashes in her eye to go to the ER immediately.  Today she was at the movie theater, watching a movie and started noticing flashes, about 15 times over the 2-hour movie.  No pain associated with it.  Slight left-sided headache.  Last episode was on the drive here.  No blurred vision.  No neck pain.  No fever.  No sinus pain.              Objective:     Past Medical History:    [id number],[id number], & 31967/SX GLOABL RLW/RT ARM/ EXP 05/28/2014    AC (acromioclavicular) arthritis    Arthritis    Back problem    advance disc disease    Bicipital tenosynovitis    Biliary dyskinesia    Body mass index between 19-24, adult    Log Date: 11/19/2012     Bronchitis    Carpal tunnel syndrome    carpal and cubital tunnel syndrome as well as long finger trigger    Contracture of palmar fascia    Log Date: 11/19/2012     COPD (chronic obstructive pulmonary disease) (HCC)    mild    Depression    resolved    Disorder of thyroid    Disorders of bursae and tendons in shoulder region, unspecified    Endometriosis    Esophageal reflux    Frozen shoulder    Rt    Gestational diabetes (HCC)    Hallux rigidus    Log Date: 02/05/2013     Hearing impairment    bilateral hearing loss    History of miscarriage    x2    History of palpitations    Hyperlipidemia    Hypothyroidism    Irritable bowel syndrome    Left foot pain    plantar plate tear, second MPJ Left    Lesion of ulnar nerve    Lumbar facet arthropathy    Malaise and fatigue    Osteoarthritis    Rt 1st metatarsophalangeal joint, Rt talonavicular joint, Rt knee    Osteopenia    Pain in joint, ankle and foot    Log Date: 11/03/2011     Plantar fasciitis     biomechanically induced    Pleurisy    Proximal phalanx fracture of finger    Rosacea    Shoulder pain    Tear of medial meniscus of right knee    Thyroid disease    Thyroid nodule    Trigger finger (acquired)    Log Date: 2011     Unspecified hypothyroidism    Visual impairment              Past Surgical History:   Procedure Laterality Date    Breast biopsy        1989    Carpal tunnel release Right 2014    Cataract Bilateral 2010    Cholecystectomy  10/25/2006    Colonoscopy  , 7/15/2002, 2007    Colonoscopy N/A 2018    Procedure: COLONOSCOPY, POSSIBLE BIOPSY, POSSIBLE POLYPECTOMY 37242;  Surgeon: Charan Alexandre MD;  Location: [facility]    Colonoscopy,biopsy N/A 2015    Procedure: COLONOSCOPY, POSSIBLE BIOPSY, POSSIBLE POLYPECTOMY 59464;  Surgeon: Charan Alexandre MD;  Location: [facility]    Correct bunion,othr methods      D & c  87    Dental surgery procedure      wisdom teeth removal    Electrocardiogram, complete  2013    Foot surgery Left     exchange screw    Foot/toes surgery proc unlisted      b/l big toe surgery    Foot/toes surgery proc unlisted Left 2013    Toe vick implant and synovectomy    Foot/toes surgery proc unlisted Left 2012    Lt great toe bunionectomy    Hand/finger surgery unlisted  2014, 2012, 2005 2 trigger finger releases all Rt side, 2012 Rt ring finger trigger release 2005 Lt thumb trigger digit release    Hand/finger surgery unlisted  2014    A1 pulley release    Hand/finger surgery unlisted Left 18--Dr. Mccormack    ring finger A1 pulley release/trigger finger release    Hand/finger surgery unlisted Right 20--Dr. Eulalio Mccormack    thumb [facility] joint ligament reconstruction tendon interposition arthroplasty    Hysterectomy  1992    with BSO      10/1982; 1985    Other  2014    right ulnar nerve decompression    Other surgical history      Lt  thumb tendon transplant    Other surgical history Left 2004    L arthrotomy & tendon transfer    Other surgical history Bilateral 1999    Cheilectomy    Other surgical history  2011, 2006 US guided thyroid biopsy, 2006 FNA aspiration biopsy Rt & Lt thyroid nodules    Shoulder arthroscopy  10/2013, 2013    R-2013 Rt rotator cuff repair of subscapularis tendon,subacromial decompression, distal clavicle excision, biceps subpectoral mini open tenodesis (Dr Daigle), L-10/2013; bilat rotator cuff and tenodesis    Tubal ligation  1991    Upper gi endoscopy,exam  2006                Social History     Socioeconomic History    Marital status:    Tobacco Use    Smoking status: Former     Current packs/day: 0.00     Average packs/day: 1.5 packs/day for 14.0 years (21.0 ttl pk-yrs)     Types: Cigarettes     Start date: 1968     Quit date: 1982     Years since quittin.7    Smokeless tobacco: Never   Vaping Use    Vaping status: Never Used   Substance and Sexual Activity    Alcohol use: Yes     Alcohol/week: 10.0 standard drinks of alcohol     Types: 10 Glasses of wine per week    Drug use: No   Other Topics Concern    Caffeine Concern No    Exercise No    Seat Belt No    Special Diet No    Stress Concern No    Weight Concern No                  Physical Exam     ED Triage Vitals [24 1746]   /86   Pulse 81   Resp 17   Temp 97.8 °F (36.6 °C)   Temp src Temporal   SpO2 98 %   O2 Device None (Room air)       Current Vitals:   Vital Signs  BP: 154/86  Pulse: 70  Resp: 19  Temp: 97.8 °F (36.6 °C)  Temp src: Temporal    Oxygen Therapy  SpO2: 98 %  O2 Device: None (Room air)        Physical Exam  Vitals and nursing note reviewed.   Constitutional:       Appearance: She is not toxic-appearing.   HENT:      Head: Normocephalic.      Nose: Nose normal.      Mouth/Throat:      Mouth: Mucous membranes are moist.   Eyes:      Extraocular Movements: Extraocular  movements intact.      Pupils: Pupils are equal, round, and reactive to light.      Comments: Funduscopic exam without any obvious abnormalities   Cardiovascular:      Rate and Rhythm: Normal rate.      Pulses: Normal pulses.      Heart sounds: Normal heart sounds.   Pulmonary:      Effort: Pulmonary effort is normal. No respiratory distress.      Breath sounds: Normal breath sounds.   Musculoskeletal:      Cervical back: Normal range of motion and neck supple.   Skin:     General: Skin is warm and dry.   Neurological:      General: No focal deficit present.      Mental Status: She is alert and oriented to person, place, and time.      Cranial Nerves: No cranial nerve deficit.      Sensory: No sensory deficit.      Motor: No weakness.      Coordination: Coordination normal.      Gait: Gait normal.   Psychiatric:         Mood and Affect: Mood normal.         Behavior: Behavior normal.       NIHSS 0      ED Course   Labs Reviewed - No data to display                MDM      CTA BRAIN + CTA CAROTIDS (CPT=70496/61004)    Result Date: 11/3/2024  CONCLUSION:  1. No acute intracranial pathology. 2. No aneurysm, vascular malformation or significant stenosis.. 3. Small left frontal parafalcine meningioma.   LOCATION:  [facility]   Dictated by (CST): Henrique Rosas MD on 11/03/2024 at 6:56 PM     Finalized by (CST): Henrique Rosas MD on 11/03/2024 at 7:12 PM        I independently interpreted the CT the brain without any obvious signs of acute hemorrhage     at bedside helpful to provide information on the history presenting illness    Differential diagnosis includes, but not limited to, CVA, TIA, retinal issue,  Complex migraine/atypical migraine    External chart review demonstrates outpatient PCP visit as recently as last week    74-year-old female with visual flashes in the left eye.  None since she was on her way here.  No painful extraocular movement.  Pressures are intact.  Funduscopic exam at bedside without any  obvious abnormalities.  Headache is resolved.  No temporal tenderness.  Her neuroexam is normal NIH is 0.  She called her son Guido who is a retired ER physician from [employer] and we spoke over the phone regarding the workup.  He consulted some of his friends and colleagues over the phone as well.  Recommend outpatient ophthalmology visit tomorrow which she states she can arrange.  Given ophtho on-call if needed.  Did offer consider and discuss MRI and further workup.  She declines.  She had normal blood work tonight, says she had recent blood work and waive labs.  She has no other complaints.  The flashes were in her visual left upper quadrant where she does get floaters in the past.  Low discussion at bedside with her  and her son over the phone.  Declined further workup at this time, shared decision made utilized, return precaution provided given very strict stroke precautions and she is agreeable.  Aspirin given.  Discharged home after discussion of risk benefits and alternatives    Patient was screened and evaluated during this visit.  As the treating physician attending to the patient, I determined within reasonable clinical confidence and prior to discharge, that an emergency medical condition was not or was no longer present.  There was no indication for further evaluation, treatment, or admission on an emergency basis.  Comprehensive verbal and written discharge and follow-up instructions were provided to help prevent relapse or worsening.  Patient was instructed to follow-up with their primary care provider for further evaluation and treatment, return immediately to ER for worsening, concerning, new, or changing/persisting symptoms. I discussed the case with the patient and they had no questions, complaints, or concerns.  Patient was comfortable going home.     Per the discharge paperwork, patients are encouraged to and given instructions on how to sign [facility], where they have access to their  records, including any/all incidental findings.     This note was prepared using Dragon Medical voice recognition dictation software. As a result errors may occur. When identified these errors have been corrected. While every attempt is made to correct errors during dictation discrepancies may still exist    Note to patient: The 21st Century Cures Act makes medical notes like these available to patients in the interest of transparency. However, this is a medical document intended as peer to peer communication. It is written in medical language and may contain abbreviations or verbiage that are unfamiliar. It may appear blunt or direct. Medical documents are intended to carry relevant information, facts as evident, and the clinical opinion of the practitioner.     Medical Decision Making      Disposition and Plan     Clinical Impression:  1. Visual disturbance         Disposition:  Discharge  11/3/2024  7:51 pm    Follow-up:  Kendra Bailey DO  [address], [address]  [place] [address]  [phone]    Follow up      Stanley Flores MD  [facility] [place] [address]  [phone]    Follow up  As needed          Medications Prescribed:  Current Discharge Medication List              Supplementary Documentation:

## 2025-01-02 DIAGNOSIS — Z12.31 VISIT FOR SCREENING MAMMOGRAM: Primary | ICD-10-CM

## 2025-01-17 ENCOUNTER — HOSPITAL ENCOUNTER (OUTPATIENT)
Dept: CT IMAGING | Age: 75
Discharge: HOME | End: 2025-01-17
Attending: OBSTETRICS & GYNECOLOGY

## 2025-01-17 DIAGNOSIS — Z12.31 VISIT FOR SCREENING MAMMOGRAM: ICD-10-CM

## 2025-01-17 PROCEDURE — 77067 SCR MAMMO BI INCL CAD: CPT

## 2025-02-12 ENCOUNTER — CLINICAL ABSTRACT (OUTPATIENT)
Age: 75
End: 2025-02-12

## 2025-05-23 ENCOUNTER — NURSE TRIAGE (OUTPATIENT)
Age: 75
End: 2025-05-23

## 2025-05-23 NOTE — TELEPHONE ENCOUNTER
Action Requested: Summary for Provider     []  Critical Lab, Recommendations Needed  [x] Need Additional Advice  []   FYI    []   Need Orders  [] Need Medications Sent to Pharmacy  []  Other     SUMMARY: Patient reports heart racing 2-3 x a week for the past month. She denies chest pain, shortness of breath, dizziness, lightheadedness. She checked her Apple Watch while on the call and HR was 78, which she states is \"high for her\". Reassured patient it is in normal range. Patient takes Levothyroxine 125 mcg Mon/Fri and 112 mcg the rest of the week.      Advised patient appointment within 3 days.   Dr Bailey you do not have any availability next week until Friday. Are you able to accommodate patient?    Reason for call: Arrythmia/Palpitations  Onset: 1 month       Reason for Disposition   History of hyperthyroidism or taking thyroid medication    Protocols used: Heart Rate and Heartbeat [id number]

## 2025-05-23 NOTE — TELEPHONE ENCOUNTER
Spoke to patient, offered appointment on 5/30, patient accepted. Appointment made for 5/30 at 3:30 Pm. Er precautions given, patient verbalizes understanding.

## 2025-05-30 ENCOUNTER — OFFICE VISIT (OUTPATIENT)
Age: 75
End: 2025-05-30
Payer: MEDICARE

## 2025-05-30 VITALS
RESPIRATION RATE: 16 BRPM | WEIGHT: 128.81 LBS | HEIGHT: 61 IN | BODY MASS INDEX: 24.32 KG/M2 | DIASTOLIC BLOOD PRESSURE: 80 MMHG | HEART RATE: 85 BPM | SYSTOLIC BLOOD PRESSURE: 146 MMHG | OXYGEN SATURATION: 98 % | TEMPERATURE: 98 F

## 2025-05-30 DIAGNOSIS — I10 ESSENTIAL HYPERTENSION: Primary | ICD-10-CM

## 2025-05-30 DIAGNOSIS — R00.2 PALPITATIONS: ICD-10-CM

## 2025-05-30 PROBLEM — H43.812 VITREOUS DEGENERATION OF LEFT EYE: Status: ACTIVE | Noted: 2025-05-30

## 2025-05-30 PROCEDURE — 3079F DIAST BP 80-89 MM HG: CPT | Performed by: FAMILY MEDICINE

## 2025-05-30 PROCEDURE — 1160F RVW MEDS BY RX/DR IN RCRD: CPT | Performed by: FAMILY MEDICINE

## 2025-05-30 PROCEDURE — 3008F BODY MASS INDEX DOCD: CPT | Performed by: FAMILY MEDICINE

## 2025-05-30 PROCEDURE — G2211 COMPLEX E/M VISIT ADD ON: HCPCS | Performed by: FAMILY MEDICINE

## 2025-05-30 PROCEDURE — 1159F MED LIST DOCD IN RCRD: CPT | Performed by: FAMILY MEDICINE

## 2025-05-30 PROCEDURE — 3077F SYST BP >= 140 MM HG: CPT | Performed by: FAMILY MEDICINE

## 2025-05-30 PROCEDURE — 99214 OFFICE O/P EST MOD 30 MIN: CPT | Performed by: FAMILY MEDICINE

## 2025-05-30 RX ORDER — AMLODIPINE BESYLATE 2.5 MG/1
2.5 TABLET ORAL DAILY
Qty: 90 TABLET | Refills: 0 | Status: SHIPPED | OUTPATIENT
Start: 2025-05-30

## 2025-06-02 ENCOUNTER — PATIENT MESSAGE (OUTPATIENT)
Age: 75
End: 2025-06-02

## 2025-06-02 NOTE — PROGRESS NOTES
Subjective:   Patient ID: Autumn Gonsalez is a 75 year old female.    HPI Here with c/o feeling heart beating more noticeable than usual and faster than her usual. Has heart monitor on watch and hasn't seen any rates >100 at rest. No associated shortness of breath/chest pain. Is intermittent. Has also had elevated BP readings at her various dr appts over the past couple of months. Started to keep track at home and averaging 150/90.     History/Other:   Past Medical History[1]    Review of Systems   Constitutional:  Negative for chills, fatigue and fever.   Respiratory:  Negative for chest tightness and shortness of breath.    Cardiovascular:  Negative for chest pain and leg swelling.   Neurological:  Negative for dizziness and light-headedness.     Current Medications[2]  Allergies:Allergies[3]    Objective:   Physical Exam  Vitals reviewed.   Constitutional:       Appearance: Normal appearance. She is well-developed.   HENT:      Head: Normocephalic and atraumatic.   Cardiovascular:      Rate and Rhythm: Normal rate and regular rhythm.      Heart sounds: Normal heart sounds.   Pulmonary:      Effort: Pulmonary effort is normal.      Breath sounds: Normal breath sounds.   Neurological:      Mental Status: She is alert.   Psychiatric:         Mood and Affect: Mood normal.         Behavior: Behavior normal.         Assessment & Plan:   1. Essential hypertension    2. Palpitations    New diagnosis. Start amlodipine 2.5mg. Discussed risks/benefits/potential side effects and proper use of medication. Continue checking BP once daily. Check UA, BMP 1-2 weeks after stating med, EKG in office in 2 weeks.   If persists discussed talking with Endo about changing Levothyroxine dose. Holter if persists.     Orders Placed This Encounter   Procedures    BASIC METABOLIC PANEL [13603] [Q]    URINALYSIS, ROUTINE [7723][Q]       Meds This Visit:  Requested Prescriptions     Signed Prescriptions Disp Refills    amLODIPine 2.5 MG  Oral Tab 90 tablet 0     Sig: Take 1 tablet (2.5 mg total) by mouth daily.       Imaging & Referrals:  None         [1]   Past Medical History:   59269,59662, & 87226/SX GLOABL RLW/RT ARM/ EXP 05/28/2014    AC (acromioclavicular) arthritis    Arthritis    Back problem    advance disc disease    Bicipital tenosynovitis    Biliary dyskinesia    Body mass index between 19-24, adult    Log Date: 11/19/2012     Bronchitis    Carpal tunnel syndrome    carpal and cubital tunnel syndrome as well as long finger trigger    Contracture of palmar fascia    Log Date: 11/19/2012     COPD (chronic obstructive pulmonary disease) (HCC)    mild    Depression    resolved    Disorder of thyroid    Disorders of bursae and tendons in shoulder region, unspecified    Endometriosis    Esophageal reflux    Frozen shoulder    Rt    Gestational diabetes (HCC)    Hallux rigidus    Log Date: 02/05/2013     Hearing impairment    bilateral hearing loss    History of miscarriage    x2    History of palpitations    Hyperlipidemia    Hypothyroidism    Irritable bowel syndrome    Left foot pain    plantar plate tear, second MPJ Left    Lesion of ulnar nerve    Lumbar facet arthropathy    Malaise and fatigue    Osteoarthritis    Rt 1st metatarsophalangeal joint, Rt talonavicular joint, Rt knee    Osteopenia    Pain in joint, ankle and foot    Log Date: 11/03/2011     Plantar fasciitis    biomechanically induced    Pleurisy    Proximal phalanx fracture of finger    Rosacea    Shoulder pain    Tear of medial meniscus of right knee    Thyroid disease    Thyroid nodule    Trigger finger (acquired)    Log Date: 08/16/2011     Unspecified hypothyroidism    Visual impairment   [2]   Current Outpatient Medications   Medication Sig Dispense Refill    amLODIPine 2.5 MG Oral Tab Take 1 tablet (2.5 mg total) by mouth daily. 90 tablet 0    atorvastatin 20 MG Oral Tab Take 1 tablet (20 mg total) by mouth nightly. 100 tablet 3    omeprazole 20 MG Oral Capsule Delayed  Release Take 1 capsule (20 mg total) by mouth before breakfast. 90 capsule 3    Estradiol 10 MCG Vaginal Tab Place 10 mcg vaginally twice a week.      OIL OF OREGANO OR Take by mouth.      levothyroxine 112 MCG Oral Tab Take 1 tablet (112 mcg total) by mouth every other day.      CINNAMON OR Take by mouth.      Coenzyme Q10 (COQ10 OR) Take  by mouth.      levothyroxine 125 MCG Oral Tab Take 1 tablet (125 mcg total) by mouth every other day.      Multiple Vitamins-Minerals (MULTIVITAMIN OR) Take  by mouth.      Omega-3 Fatty Acids (FISH OIL) 1000 MG Oral Capsule Delayed Release Take  by mouth.      Calcium Carbonate-Vitamin D (CALCIUM + D OR) Take  by mouth.      GINKGO BILOBA by Does not apply route.      aspirin 81 MG Oral Tab Take 1 tablet (81 mg total) by mouth daily.      Glucosamine-Chondroit-Vit C-Mn (GLUCOSAMINE 1500 COMPLEX OR) Take  by mouth.      Ascorbic Acid (VITAMIN C OR) Take  by mouth.     [3]   Allergies  Allergen Reactions    Dust Mites     Geocillin [Carbenicillin]     Mold Spores     Penicillins      Sensitivity; Stomach Upset

## 2025-06-17 ENCOUNTER — OFFICE VISIT (OUTPATIENT)
Age: 75
End: 2025-06-17
Payer: MEDICARE

## 2025-06-17 VITALS
BODY MASS INDEX: 24.13 KG/M2 | DIASTOLIC BLOOD PRESSURE: 68 MMHG | WEIGHT: 127.81 LBS | SYSTOLIC BLOOD PRESSURE: 136 MMHG | HEART RATE: 76 BPM | TEMPERATURE: 97 F | HEIGHT: 61 IN | OXYGEN SATURATION: 96 % | RESPIRATION RATE: 16 BRPM

## 2025-06-17 DIAGNOSIS — R00.2 PALPITATIONS: ICD-10-CM

## 2025-06-17 DIAGNOSIS — I10 ESSENTIAL HYPERTENSION: Primary | ICD-10-CM

## 2025-06-17 LAB
ATRIAL RATE: 70 BPM
P AXIS: 32 DEGREES
P-R INTERVAL: 160 MS
Q-T INTERVAL: 382 MS
QRS DURATION: 82 MS
QTC CALCULATION (BEZET): 412 MS
R AXIS: 62 DEGREES
T AXIS: 43 DEGREES
VENTRICULAR RATE: 70 BPM

## 2025-06-17 PROCEDURE — 99214 OFFICE O/P EST MOD 30 MIN: CPT | Performed by: FAMILY MEDICINE

## 2025-06-17 PROCEDURE — 3075F SYST BP GE 130 - 139MM HG: CPT | Performed by: FAMILY MEDICINE

## 2025-06-17 PROCEDURE — 3008F BODY MASS INDEX DOCD: CPT | Performed by: FAMILY MEDICINE

## 2025-06-17 PROCEDURE — 93000 ELECTROCARDIOGRAM COMPLETE: CPT | Performed by: FAMILY MEDICINE

## 2025-06-17 PROCEDURE — 1160F RVW MEDS BY RX/DR IN RCRD: CPT | Performed by: FAMILY MEDICINE

## 2025-06-17 PROCEDURE — 1159F MED LIST DOCD IN RCRD: CPT | Performed by: FAMILY MEDICINE

## 2025-06-17 PROCEDURE — 3078F DIAST BP <80 MM HG: CPT | Performed by: FAMILY MEDICINE

## 2025-06-17 NOTE — PROGRESS NOTES
Subjective:   Patient ID: Autumn Gonsalez is a 75 year old female.    HPI Here for f/u from amlodipine 2.5mg start. Taking as prescribed with no issues. Has been checking BP at home and averaging 130s/high 70s. Continues to have some sensation of heart pounding. No chest pain/shortness of breath. Is wondering about following up with her Endo on this in case it is her thyroid.     History/Other:   Review of Systems   Respiratory:  Negative for chest tightness and shortness of breath.    Cardiovascular:  Positive for palpitations. Negative for chest pain and leg swelling.   Neurological:  Negative for dizziness, light-headedness and headaches.     Current Medications[1]  Allergies:Allergies[2]    Objective:   Physical Exam  Vitals reviewed.   Constitutional:       Appearance: Normal appearance. She is well-developed.   HENT:      Head: Normocephalic and atraumatic.   Pulmonary:      Effort: Pulmonary effort is normal.   Neurological:      Mental Status: She is alert.   Psychiatric:         Mood and Affect: Mood normal.         Behavior: Behavior normal.         Assessment & Plan:   1. Essential hypertension    2. Palpitations    BP controlled on med. Continue. Check BMP and UA.   EKG NSR. F/u Endo. Discussed holter if persists.     No orders of the defined types were placed in this encounter.      Meds This Visit:  Requested Prescriptions      No prescriptions requested or ordered in this encounter       Imaging & Referrals:  ELECTROCARDIOGRAM, COMPLETE         [1]   Current Outpatient Medications   Medication Sig Dispense Refill    amLODIPine 2.5 MG Oral Tab Take 1 tablet (2.5 mg total) by mouth daily. 90 tablet 0    atorvastatin 20 MG Oral Tab Take 1 tablet (20 mg total) by mouth nightly. 100 tablet 3    omeprazole 20 MG Oral Capsule Delayed Release Take 1 capsule (20 mg total) by mouth before breakfast. 90 capsule 3    Estradiol 10 MCG Vaginal Tab Place 10 mcg vaginally twice a week.      OIL OF OREGANO OR Take  by mouth.      levothyroxine 112 MCG Oral Tab Take 1 tablet (112 mcg total) by mouth every other day.      CINNAMON OR Take by mouth.      Coenzyme Q10 (COQ10 OR) Take  by mouth.      levothyroxine 125 MCG Oral Tab Take 1 tablet (125 mcg total) by mouth every other day.      Multiple Vitamins-Minerals (MULTIVITAMIN OR) Take  by mouth.      Omega-3 Fatty Acids (FISH OIL) 1000 MG Oral Capsule Delayed Release Take  by mouth.      Calcium Carbonate-Vitamin D (CALCIUM + D OR) Take  by mouth.      GINKGO BILOBA by Does not apply route.      aspirin 81 MG Oral Tab Take 1 tablet (81 mg total) by mouth daily.      Glucosamine-Chondroit-Vit C-Mn (GLUCOSAMINE 1500 COMPLEX OR) Take  by mouth.      Ascorbic Acid (VITAMIN C OR) Take  by mouth.     [2]   Allergies  Allergen Reactions    Dust Mites     Geocillin [Carbenicillin]     Mold Spores     Penicillins      Sensitivity; Stomach Upset

## 2025-06-19 LAB
AMB EXT BILIRUBIN URINE: NEGATIVE
AMB EXT BLOOD URINE: NEGATIVE
AMB EXT BUN: 19 MG/DL
AMB EXT CALCIUM: 9.6
AMB EXT CHLORIDE: 102
AMB EXT CREATININE: 0.84 MG/DL
AMB EXT GLUCOSE URINE: NEGATIVE
AMB EXT GLUCOSE: 92 MG/DL
AMB EXT KETONES URINE: NEGATIVE
AMB EXT LEUKOCYTE ESTERASE URINE: NEGATIVE
AMB EXT NITRITE URINE: NEGATIVE
AMB EXT PH URINE: 5.5
AMB EXT POSTASSIUM: 4.6 MMOL/L (ref 3.5–5.3)
AMB EXT PROTEIN URINE: NEGATIVE
AMB EXT SODIUM: 138 MMOL/L (ref 135–146)
AMB EXT URINE CLARITY: CLEAR
AMB EXT URINE COLOR: YELLOW
AMB EXT URINE SPECIFIC GRAVITY: 1.01

## 2025-06-20 ENCOUNTER — TELEPHONE (OUTPATIENT)
Age: 75
End: 2025-06-20

## 2025-06-20 NOTE — TELEPHONE ENCOUNTER
Received fax from "[employer] labs collected 6/19/2025, labs result enter    Abstracted    Put in AMS bin to be review

## 2025-06-25 ENCOUNTER — TELEPHONE (OUTPATIENT)
Age: 75
End: 2025-06-25

## 2025-06-25 NOTE — TELEPHONE ENCOUNTER
Spoke with patient. Advised patient of provider's notes and recommendations. Patient verbalized understanding and agreed with plan.

## 2025-06-25 NOTE — TELEPHONE ENCOUNTER
Please call patient. We received lab results from [facility]. We did them as part of her eval for new HTN. Her UA and BMP are both normal.

## 2025-07-04 RX ORDER — OMEPRAZOLE 20 MG/1
20 CAPSULE, DELAYED RELEASE ORAL
Qty: 90 CAPSULE | Refills: 3 | Status: SHIPPED | OUTPATIENT
Start: 2025-07-04

## 2025-07-04 NOTE — TELEPHONE ENCOUNTER
Refill passed per [facility] protocol.    Requested Prescriptions   Pending Prescriptions Disp Refills    OMEPRAZOLE 20 MG Oral Capsule Delayed Release [Pharmacy Med Name: Omeprazole Dr 20 Mg Cap Nort] 90 capsule 0     Sig: Take 1 capsule (20 mg total) by mouth before breakfast.       Gastrointestional Medication Protocol Passed - 7/4/2025 10:56 AM        Passed - In person appointment or virtual visit in the past 12 mos or appointment in next 3 mos     Recent Outpatient Visits              2 weeks ago Essential hypertension    [address]  [address] [place]. [address] Kendra Bailey,     Office Visit    1 month ago Essential hypertension    [address]  [address] [place] [address] Kendra Bailey,     Office Visit    8 months ago [address] examination    [address],     Office Visit    9 months ago Encounter for annual health examination    [address],     Office Visit    1 year ago Encounter for annual health examination    [address] DO    Office Visit                      Passed - Medication is active on med list             Recent Outpatient Visits              2 weeks ago Essential hypertension    [address]  [address] [place] [address] Kendra Bailey,     Office Visit    1 month ago Essential hypertension    [address]  [address] [place] [address] Kendra Bailey,     Office Visit    8 months ago Preoperative examination    [address] Kendra Sebastian,     Office Visit    9 months ago Encounter for annual health  examination    [facility], [address] [place] [address], Kendra Sebastian,     Office Visit    1 year ago Encounter for annual health examination    [facility], [address] [place] [address], Kendra Sebastian,     Office Visit

## 2025-08-20 RX ORDER — AMLODIPINE BESYLATE 2.5 MG/1
2.5 TABLET ORAL DAILY
Qty: 90 TABLET | Refills: 0 | Status: SHIPPED | OUTPATIENT
Start: 2025-08-20

## (undated) DEVICE — GLOVE SURG SENSICARE SZ 6-1/2

## (undated) DEVICE — REMOVER PREP SOLUTION 4OZ

## (undated) DEVICE — NEEDLE SPINAL 22X3-1/2 BLK

## (undated) DEVICE — SYRINGE 10ML LL CONTRL SYRINGE

## (undated) DEVICE — GLOVE SURG SENSICARE SZ 7-1/2

## (undated) DEVICE — BANDAID CURAD 3IN X 1IN

## (undated) DEVICE — PAIN TRAY: Brand: MEDLINE INDUSTRIES, INC.

## (undated) DEVICE — SKIN MARKER DUAL TIP WITH RULER CAP AND LABELS: Brand: DEVON

## (undated) NOTE — LETTER
10/11/19        [address]  [address] [facility] [address]      Dear Vahe Yang,    [facility] records indicate that you have outstanding lab work and or testing that was ordered for you and has not yet been completed:  Orders Placed This Enc

## (undated) NOTE — LETTER
10/10/19        [address] [id number]      Dear Gucci Wallace,    [address] records indicate that you have outstanding lab work and or testing that was ordered for you and has not yet been completed:  Orders Placed This Enc

## (undated) NOTE — LETTER
05/04/20        [address]  [address] [facility] [address]      Dear [facility],    [address] [place] [address] records indicate that you have outstanding FASTING lab work and or testing that was ordered for you and has not yet been completed:  Orders Placed